# Patient Record
Sex: FEMALE | Race: WHITE | URBAN - METROPOLITAN AREA
[De-identification: names, ages, dates, MRNs, and addresses within clinical notes are randomized per-mention and may not be internally consistent; named-entity substitution may affect disease eponyms.]

---

## 2018-06-26 PROCEDURE — 88175 CYTOPATH C/V AUTO FLUID REDO: CPT | Performed by: NURSE PRACTITIONER

## 2019-07-01 PROCEDURE — 88175 CYTOPATH C/V AUTO FLUID REDO: CPT | Performed by: OBSTETRICS & GYNECOLOGY

## 2023-09-11 ENCOUNTER — TELEPHONE (OUTPATIENT)
Dept: RHEUMATOLOGY | Facility: CLINIC | Age: 63
End: 2023-09-11

## 2023-09-11 ENCOUNTER — OFFICE VISIT (OUTPATIENT)
Dept: RHEUMATOLOGY | Facility: CLINIC | Age: 63
End: 2023-09-11
Payer: COMMERCIAL

## 2023-09-11 VITALS
TEMPERATURE: 86 F | WEIGHT: 169 LBS | SYSTOLIC BLOOD PRESSURE: 142 MMHG | DIASTOLIC BLOOD PRESSURE: 68 MMHG | HEIGHT: 63 IN | RESPIRATION RATE: 16 BRPM | OXYGEN SATURATION: 98 % | BODY MASS INDEX: 29.95 KG/M2

## 2023-09-11 DIAGNOSIS — M15.9 PRIMARY OSTEOARTHRITIS INVOLVING MULTIPLE JOINTS: ICD-10-CM

## 2023-09-11 DIAGNOSIS — L40.50 PSORIATIC ARTHRITIS (HCC): Primary | ICD-10-CM

## 2023-09-11 PROBLEM — M15.0 PRIMARY OSTEOARTHRITIS INVOLVING MULTIPLE JOINTS: Status: ACTIVE | Noted: 2023-09-11

## 2023-09-11 PROCEDURE — 99215 OFFICE O/P EST HI 40 MIN: CPT | Performed by: INTERNAL MEDICINE

## 2023-09-11 PROCEDURE — 3008F BODY MASS INDEX DOCD: CPT | Performed by: INTERNAL MEDICINE

## 2023-09-11 PROCEDURE — 3077F SYST BP >= 140 MM HG: CPT | Performed by: INTERNAL MEDICINE

## 2023-09-11 PROCEDURE — 3078F DIAST BP <80 MM HG: CPT | Performed by: INTERNAL MEDICINE

## 2023-09-11 RX ORDER — SECUKINUMAB 150 MG/ML
INJECTION SUBCUTANEOUS
COMMUNITY
Start: 2023-06-20

## 2023-09-11 RX ORDER — MELOXICAM 7.5 MG/1
7.5 TABLET ORAL 2 TIMES DAILY PRN
COMMUNITY
Start: 2022-11-22

## 2023-09-14 ENCOUNTER — TELEPHONE (OUTPATIENT)
Dept: RHEUMATOLOGY | Facility: CLINIC | Age: 63
End: 2023-09-14

## 2023-10-03 ENCOUNTER — TELEPHONE (OUTPATIENT)
Dept: RHEUMATOLOGY | Facility: CLINIC | Age: 63
End: 2023-10-03

## 2023-10-03 NOTE — TELEPHONE ENCOUNTER
Phoned [facility], pt had syringes delivered in the past and prescription was for pens. Attempted tot call pt no answer. Changed to syringes per her preference.

## 2023-12-12 RX ORDER — SECUKINUMAB 150 MG/ML
300 INJECTION SUBCUTANEOUS
Qty: 2 ML | Refills: 0 | Status: SHIPPED | OUTPATIENT
Start: 2023-12-12

## 2023-12-12 NOTE — TELEPHONE ENCOUNTER
LOV:   09/11/2023      Future Appointments   Date Time Provider Indira Patricia   3/11/2024 11:00 AM Tonia Martin MD [facility] [address]       LF:  11/29/2023    QTY:   2 pens       Refills:   0    LABS: 05/23/2023 Scanned under the lab tab on 09/19/2023

## 2024-01-19 RX ORDER — SECUKINUMAB 150 MG/ML
300 INJECTION SUBCUTANEOUS
Qty: 2 ML | Refills: 0 | Status: SHIPPED | OUTPATIENT
Start: 2024-01-19

## 2024-01-19 NOTE — TELEPHONE ENCOUNTER
LOV:   09/11/2023       Future Appointments   Date Time Provider Department Center   3/11/2024 11:00 AM Zeinab Martin MD [facility] [address]       LF:    12/12/2023   QTY:  2 mL   Refills:  0    LABS: 05/23/2023

## 2024-02-16 DIAGNOSIS — L40.50 PSORIATIC ARTHRITIS (HCC): Primary | ICD-10-CM

## 2024-02-16 RX ORDER — SECUKINUMAB 150 MG/ML
300 INJECTION SUBCUTANEOUS
Qty: 2 ML | Refills: 0 | Status: SHIPPED | OUTPATIENT
Start: 2024-02-16

## 2024-02-16 NOTE — TELEPHONE ENCOUNTER
LOV:    09/11/2023      Future Appointments   Date Time Provider Department Center   3/11/2024 11:00 AM Zeinab Martin MD [facility] [address]       LF:   01/19/2024    QTY:   2 mL    Refills:   0    LABS:      Scanned in under the lab tab on 09/19/2023 Drawn on 05/23/2023  Patient is due for labs now.

## 2024-03-01 ENCOUNTER — TELEPHONE (OUTPATIENT)
Dept: RHEUMATOLOGY | Facility: CLINIC | Age: 64
End: 2024-03-01

## 2024-03-01 DIAGNOSIS — L40.50 PSORIATIC ARTHRITIS (HCC): Primary | ICD-10-CM

## 2024-03-01 NOTE — TELEPHONE ENCOUNTER
LOV 9/11/23  Future Appointments   Date Time Provider Department Center   3/11/2024 11:00 AM Zeinab Martin MD [facility] [address]

## 2024-03-08 LAB
ABSOLUTE BASOPHILS: 18 CELLS/UL (ref 0–200)
ABSOLUTE EOSINOPHILS: 194 CELLS/UL (ref 15–500)
ABSOLUTE LYMPHOCYTES: 1238 CELLS/UL (ref 850–3900)
ABSOLUTE MONOCYTES: 576 CELLS/UL (ref 200–950)
ABSOLUTE NEUTROPHILS: 2475 CELLS/UL (ref 1500–7800)
ALBUMIN/GLOBULIN RATIO: 1.4 (CALC) (ref 1–2.5)
ALBUMIN: 4.2 G/DL (ref 3.6–5.1)
ALKALINE PHOSPHATASE: 66 U/L (ref 37–153)
ALT: 20 U/L (ref 6–29)
AST: 18 U/L (ref 10–35)
BASOPHILS: 0.4 %
BILIRUBIN, TOTAL: 0.6 MG/DL (ref 0.2–1.2)
BUN: 11 MG/DL (ref 7–25)
C-REACTIVE PROTEIN: 1.4 MG/L
CALCIUM: 10.3 MG/DL (ref 8.6–10.4)
CARBON DIOXIDE: 27 MMOL/L (ref 20–32)
CHLORIDE: 100 MMOL/L (ref 98–110)
CREATININE: 0.59 MG/DL (ref 0.5–1.05)
EGFR: 102 ML/MIN/1.73M2
EOSINOPHILS: 4.3 %
GLOBULIN: 3.1 G/DL (CALC) (ref 1.9–3.7)
GLUCOSE: 92 MG/DL (ref 65–99)
HEMATOCRIT: 38.3 % (ref 35–45)
HEMOGLOBIN: 12.5 G/DL (ref 11.7–15.5)
LYMPHOCYTES: 27.5 %
MCH: 27.7 PG (ref 27–33)
MCHC: 32.6 G/DL (ref 32–36)
MCV: 84.7 FL (ref 80–100)
MONOCYTES: 12.8 %
MPV: 11.7 FL (ref 7.5–12.5)
NEUTROPHILS: 55 %
PLATELET COUNT: 221 THOUSAND/UL (ref 140–400)
POTASSIUM: 4 MMOL/L (ref 3.5–5.3)
PROTEIN, TOTAL: 7.3 G/DL (ref 6.1–8.1)
RDW: 12.2 % (ref 11–15)
RED BLOOD CELL COUNT: 4.52 MILLION/UL (ref 3.8–5.1)
SED RATE BY MODIFIED$WESTERGREN: 2 MM/H
SODIUM: 136 MMOL/L (ref 135–146)
URIC ACID: 4.8 MG/DL (ref 2.5–7)
WHITE BLOOD CELL COUNT: 4.5 THOUSAND/UL (ref 3.8–10.8)

## 2024-03-11 ENCOUNTER — OFFICE VISIT (OUTPATIENT)
Dept: RHEUMATOLOGY | Facility: CLINIC | Age: 64
End: 2024-03-11
Payer: COMMERCIAL

## 2024-03-11 VITALS
SYSTOLIC BLOOD PRESSURE: 132 MMHG | OXYGEN SATURATION: 98 % | BODY MASS INDEX: 28.88 KG/M2 | DIASTOLIC BLOOD PRESSURE: 74 MMHG | WEIGHT: 163 LBS | RESPIRATION RATE: 16 BRPM | HEIGHT: 63 IN

## 2024-03-11 DIAGNOSIS — M25.432 PAIN AND SWELLING OF LEFT WRIST: ICD-10-CM

## 2024-03-11 DIAGNOSIS — M19.90 OSTEOARTHRITIS, UNSPECIFIED OSTEOARTHRITIS TYPE, UNSPECIFIED SITE: ICD-10-CM

## 2024-03-11 DIAGNOSIS — L40.50 PSORIATIC ARTHRITIS (HCC): Primary | ICD-10-CM

## 2024-03-11 DIAGNOSIS — M85.89 OSTEOPENIA OF MULTIPLE SITES: ICD-10-CM

## 2024-03-11 DIAGNOSIS — M25.532 PAIN AND SWELLING OF LEFT WRIST: ICD-10-CM

## 2024-03-11 PROCEDURE — 3075F SYST BP GE 130 - 139MM HG: CPT | Performed by: INTERNAL MEDICINE

## 2024-03-11 PROCEDURE — 3008F BODY MASS INDEX DOCD: CPT | Performed by: INTERNAL MEDICINE

## 2024-03-11 PROCEDURE — 99214 OFFICE O/P EST MOD 30 MIN: CPT | Performed by: INTERNAL MEDICINE

## 2024-03-11 PROCEDURE — 3078F DIAST BP <80 MM HG: CPT | Performed by: INTERNAL MEDICINE

## 2024-03-11 NOTE — PROGRESS NOTES
[facility], [address] [place] [address]      Consult     Chey Peterson Patient Status:  No patient class for patient encounter    1960 MRN [id number]   Location [facility], [address] [place] [address] Attending No att. providers found   Hosp Day # 0 PCP MILANA ARIAS     Referring Provider: PCP    Reason for Consultation: Psoriatic arthritis    Subjective:    Chey Peterson is a 63 year old female with     62-year-old woman comes in for reevaluation for history of cutaneous lupus, raynauds, generalized osteoarthritis and now likely psoriatic arthritis and osteopenia with low fracture risk.    She was last seen in clinic 2023    She was improved significantly on Cosentyx and she had continued had mild synovitis in psoriasis and we increased dose to 300 mg subcu once a month 4 months ago      No further swelling of her joints or psoriasis but she states every time she takes the Cosentyx she has significant night sweats.  Patient has already failed multiple other medications.  She would like to try going down back on the Cosentyx 150 mg subcu a month and she understands she may worsen again with her joints and her skin but she would like to try this first before switching therapy again    She also had a fall recently chasing her dog this morning and some wrist pain and swelling associated with this.  She is not interested in x-rays or imaging.  She will let us know if she changes her mind or see her PCP locally they are about 50 minutes away    No side effects.    Previously had injection site reactions to Humira; Enbrel and failed [facility] leflunomide and hydroxychloroquine and methotrexate    She is given meloxicam which she does use periodically with relief    She does do a lot of crocheting and she does also cook a lot    She is followed by dermatology with topical shampoos and creams for her scalp as well as a small lesion on her right  elbow    Unfortunately he also had injection site reaction to Enbrel    Also had reaction to Humira    Denies any infection since last visit    She is now off hydroxychloroquine    Eye exam was normal November 2022.  She has been off hydroxychloroquine    Previously We had stopped leflunomide    X-rays of the hands and feet were updated showing minimal arthritic changes chest x-ray was normal January 2021    No major stiffness or swelling of her joints    She has not had any infections shortness of breath chest pain fevers or chills.    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    We have discussed possibility of this being psoriasis to begin with especially with her autoimmune testing being negative on several occasions over the years    Has history of abnormal LFTs on methotrexate in the past    She was noted to have mild abnormal LFT. She states she has made some dietary changes and her primary care physician is aware of this. She never had an ultrasound to confirm fatty liver.    Her raynauds has been stable. She denies any oral or nasal ulcers , chest pain shortness of breath fevers or chills. Denies any joint pain or stiffness.   She denies any new rashes. She is trying to take calcium and vitamin D regularly. She does take Tylenol when necessary and Aleve for mild arthritic pain. Denies any night sweats.    Her last DEXA scan was in November 2016. It showed osteopenia with low fracture risk. This is closely followed by her OB/GYN.  States she had another DEXA scan which she would like to obtain.    States no falls or fractures    She is trying to take calcium and vitamin D regularly.      History/Other:        Past Medical History:  Past Medical History:   Diagnosis Date    Essential  hypertension     Osteopenia     Systemic lupus erythematosus (HCC)         Past Surgical History:   Past Surgical History:   Procedure Laterality Date    HYSTERECTOMY  2010    [id number]       Social History:  reports that she has never smoked. She has never used smokeless tobacco. She reports that she does not drink alcohol and does not use drugs.    Family History:   Family History   Problem Relation Age of Onset    Colon Cancer Father     Heart Disease Father     Breast Cancer Sister     Breast Cancer Paternal Aunt     No Known Problems Mother        Allergies:   Allergies   Allergen Reactions    Enbrel [Etanercept] SWELLING    Humira SWELLING       Current Medications:  Current Outpatient Medications   Medication Sig Dispense Refill    Secukinumab, 300 MG Dose, (COSENTYX, 300 MG DOSE,) 150 MG/ML Subcutaneous Solution Prefilled Syringe Inject 300 mg into the skin every 30 (thirty) days. 2 mL 0    Lisinopril-Hydrochlorothiazide 10-12.5 MG Oral Tab TAKE 1 TABLET BY MOUTH ONE TIME A DAY  3    Mometasone Furoate (NASONEX) 50 MCG/ACT Nasal Suspension by Nasal route daily.      Calcium-Phosphorus-Vitamin D (CITRACAL +D3 OR) Take by mouth.      Cetirizine HCl (ZYRTEC ALLERGY OR) Take by mouth.      Dextromethorphan-Guaifenesin (MUCINEX DM OR) Take by mouth.            (Not in a hospital admission)      Review of Systems:     Constitutional: Negative for chills, , fatigue, fever and unexpected weight change.    HENT: Negative for congestion, and mouth sores.    Eyes: Negative for photophobia, pain, redness and visual disturbance.    Respiratory: Negative for apnea, cough, chest tightness, shortness of breath, wheezing and stridor.    Cardiovascular: Negative for chest pain, palpitations and leg swelling.    Gastrointestinal: Negative for abdominal distention, abdominal pain, blood in stool, constipation, diarrhea and nausea.    Endocrine: Negative.     Genitourinary: Negative for decreased urine volume, difficulty  urinating, dyspareunia, dysuria, flank pain, and frequency.    Musculoskeletal: Occasional arthralgias, gait problem and joint swelling.    Skin: Negative for color change, pallor and rash. No raynauds or digital ulcerations no sclerodactly.  Mild scalp psoriasis    Allergic/Immunologic: Negative.    Neurological: Negative for dizziness, tremors, seizures, syncope, speech difficulty, weakness, light-headedness, numbness and headaches.    Hematological: Does not bruise/bleed easily.    Psychiatric/Behavioral: Negative for confusion, decreased concentration, hallucinations, self-injury, sleep disturbance and suicidal ideas or depression.    Objective:   [unfilled]  Vitals:    03/11/24 1102   BP: 132/74   Resp: 16            Constitutional: is oriented to person, place, and time. Appears well-developed and well-nourished. No distress.    HEENT: Normocephalic; EOMI; no jvd; no LAD; no oral or nasal ulcers.     Eyes: Conjunctivae and EOM are normal. Pupils are equal, round, and reactive to light.     Neck: Normal range of motion. No thyromegaly present.    Cardiovascular: RRR, no murmurs.    Lungs: Clear, Bilateral air entry, no wheezes.    Abdominal: Soft.    Musculoskeletal:    There is currently no information documented on the homunculus. Go to the Rheumatology activity and [facility]     Joint Exam 03/11/2024     No joint exam has been documented for this visit        Swollen: --     Tender: --         Right shoulder: Exhibits normal range of motion on abduction and internal rotation, no tenderness, no bony tenderness, no deformity, no laceration, no pain and no spasm.        Left shoulder: Exhibits normal range of motion on abduction and internal and external rotation.  no tenderness, no bony tenderness, no swelling, no effusion, no deformity, no pain, no spasm and normal strength.        Right elbow:  Exhibits normal range of motion, no swelling, no effusion and no deformity. No  tenderness found. No medial epicondyle, no lateral epicondyle and no olecranon process tenderness noted. There are no contractures or tophi or nodules.        Left elbow:  Normal range of motion, no swelling, no effusion and no deformity. No medial epicondyle, no lateral epicondyle and no olecranon process tenderness noted. There are no contractures or tophi or nodules.        Right wrist:  Exhibits normal range of motion, no tenderness, no bony tenderness, no swelling, no effusion and no crepitus. Flexion and extension intact w/o limitation.        Left wrist: Mild soft tissue swelling with tenderness, no bony tenderness, + swelling, no effusion, no crepitus and no deformity. Flexion and extension mildly limited because of the swelling with recent fall        Right hip: Exhibits normal range of motion, normal strength, no tenderness, no bony tenderness, no swelling and no crepitus.        Right hand: No synovitis of  or DIP joints; few small scattered Bouchards and Heberden nodules noted;  strength: 100%.  Mild squaring first CMC joint        Left hand: No synovitis of  DIP joints; few small scattered Bouchards and Heberden nodules noted;  strength: 100%.  Mild squaring first CMC joint        Left hip: Exhibits normal range of motion, normal strength, no tenderness, no bony tenderness, No swelling and no crepitus.        Right knee: Exhibits normal range of motion, no swelling, no effusion, no ecchymosis, no deformity and no erythema. No tenderness found. No medial joint line, no lateral joint line, no MCL and no LCL tenderness noted. mild crepitation on flexion of knee and extension normal.        Left knee:  Exhibits normal range of motion, no swelling, no effusion, no ecchymosis and no erythema. No tenderness found. No medial joint line, no lateral joint line and no patellar tendon tenderness noted. mild crepitation on flexion of the knee. Extension intact and normal.        Right ankle: No swelling, no  deformity. No tenderness. Dorsiflexion and plantar flexion intact without limitation in range of motion.        Left ankle: Exhibits no swelling. No tenderness. No lateral malleolus and no medial malleolus tenderness found. Achilles tendon normal. Achilles tendon exhibits no pain, no defect and normal English's test results.  Dorsiflexion and plantar flexion intact without limitation in range of motion.        Cervical back: Exhibits normal range of motion, no tenderness, no bony tenderness, no swelling, no pain and no spasm.        Thoracic back: Exhibits normal range of motion, no tenderness, no bony tenderness and no spasm.        Lumbar back:  Exhibits normal range of motion, no tenderness, no bony tenderness, no pain and no spasm.        Right foot: normal. There is normal range of motion, no tenderness, no bony tenderness, no crepitus and no laceration. There is no synovitis or tenderness of the MTP joints to palpation.  Bony enlargement first MTP joint        Left foot: normal. There is normal range of motion, no tenderness, no bony tenderness and no crepitus. There is no synovitis or tenderness of the MTP joints to palpation.  Mild bony enlargement first MTP joint    Lymphadenopathy: No submental, no submandibular, and no occipital adenopathy present, has no cervical adenopathy or axillary lympadenopathy.    Neurological: Alert and oriented. No focal motor or sensory abnormalities. Strength is 5/5 Upper Extremities/Lower Extremities proximally and distally.    Skin: Skin is warm, dry and intact.  Mild psoriasis of the scalp    Psychiatric: Normal behavior.    Results:    Labs:      No results found for: \"WBC\", \"RBC\", \"HGB\", \"HCT\", \"MCV\", \"MCH\", \"MCHC\", \"RDW\", \"PLT\", \"MPV\", \"LYMPHOCYTES\", \"NEUT\"    No components found for: \"RELY\", \"NMET\", \"MYEL\", \"PROMY\", \"ARUN\", \"ABSNEUTS\", \"ABSBANDS\", \"ABMM\", \"ABMY\", \"ABPM\", \"ABBL\"      No results found for: \"NA\", \"K\", \"CHLORIDE\", \"CO2\", \"BUN\", \"CREAT\", \"GFR\", \"ALB\",  \"ALKPHOS\", \"AST\", \"ALT\"       No components found for: \"ESRWESTERGRN\"       No results found for: \"CRP\"      No results found for: \"COLOR\", \"CLARITY\", \"UROBILINOGEN\", \"YEAST\"    Noted CBC CMP normal TB testing normal May 2023          [unfilled]    Imaging:  No results found.    Assessment & Plan:      63-year-old woman comes in for reevaluation for:    Psoriatic arthritis (extensive plaque psoriasis with erosive changes on MRI of the hand)  Raynauds  Generalized osteoarthritis  Osteopenia with low fracture risk  High Drug risk monitoring  History of abnormal LFTs  Mild left wrist swelling.  Patient declined imaging from recent fall    No further synovitis or psoriasis but patient's states significant night sweats with injection with Cosentyx.     He did not have the symptoms with the lower dose and would like to try going back down Cosentyx 150 mg subcu week minimal psoriasis.  She understands her psoriasis and joint pain may worsen again.  But she would like to try this first before switching treatment    If she worsens she will need to call us to consider switching treatment.    She declines for now    Given her information on [facility] and [employer] in the meantime for further reading    Previously failed methotrexate leflunomide hydroxychloroquine Humira Enbrel and Kerry    Suspect her CMC joint and hand pain is likely more arthritic and tendon related    Continue meloxicam 7.5 twice a day when necessary  Risk of NSAIDs discussed    Previously seen by dermatology    Remain off leflunomide. Remain off hydroxychloroquine weaned last visit    Could consider another class of medication if she has problems with this as well    Her autoimmune testing has been normal on several occasions otherwise  I question whether she actually had discoid lupus in the past?    I discussed monitoring for changes of lupus over time    Failed methotrexate And leflunomide with abnormal LFTs in the past    Patient is asymptomatic from her  raynauds    Continue calcium and vitamin D states had her bone density would like to obtain this report    Avoid too much Tylenol and Aleve    Education and counseling provided to patient.  Instructed patient to call my office or seek medical attention immediately if symptoms worsen. Risks and side effects of medications and diagnosis discussed in detail and patient was given written information on new prescribed medications.    Return to clinic:  Return in about 6 months (around 9/11/2024).    Zeinab Martin MD  9/11/2023

## 2024-03-18 DIAGNOSIS — L40.50 PSORIATIC ARTHRITIS (HCC): Primary | ICD-10-CM

## 2024-03-18 RX ORDER — SECUKINUMAB 150 MG/ML
300 INJECTION SUBCUTANEOUS
Qty: 2 EACH | Refills: 2 | Status: SHIPPED | OUTPATIENT
Start: 2024-03-18

## 2024-03-18 NOTE — TELEPHONE ENCOUNTER
Last office visit: 3/11/2024    Next Rheum Apt:9/16/2024 Zeinab Martin MD    Last fill: 2/16/2024 2 mL, 0 refills    Labs:   Lab Results   Component Value Date    CREATSERUM 0.59 03/07/2024    ALKPHO 66 03/07/2024    AST 18 03/07/2024    ALT 20 03/07/2024    BILT 0.6 03/07/2024    TP 7.3 03/07/2024    ALB 4.2 03/07/2024       Lab Results   Component Value Date    WBC 4.5 03/07/2024    HGB 12.5 03/07/2024     03/07/2024    NEPERCENT 55 03/07/2024    LYPERCENT 27.5 03/07/2024    NE 2,475 03/07/2024    LYMABS 1,238 03/07/2024

## 2024-05-15 LAB
MITOGEN-NIL: 6.35 IU/ML
NIL: 0.01 IU/ML
QUANTIFERON(R)-TB GOLD PLUS, 1 TUBE: NEGATIVE
TB1-NIL: 0 IU/ML
TB2-NIL: 0 IU/ML

## 2024-07-03 ENCOUNTER — PATIENT MESSAGE (OUTPATIENT)
Dept: RHEUMATOLOGY | Facility: CLINIC | Age: 64
End: 2024-07-03

## 2024-07-03 DIAGNOSIS — L40.50 PSORIATIC ARTHRITIS (HCC): ICD-10-CM

## 2024-07-03 RX ORDER — SECUKINUMAB 150 MG/ML
300 INJECTION SUBCUTANEOUS
Qty: 2 EACH | Refills: 2 | Status: SHIPPED | OUTPATIENT
Start: 2024-07-03 | End: 2024-07-05

## 2024-07-03 NOTE — TELEPHONE ENCOUNTER
LOV:  03/11/2024    Future Appointments   Date Time Provider Department Center   9/16/2024  9:40 AM Zeinab Martin MD [facility] [address]       LF:  03/18/2024    QTY:   2 Each    Refills:   2    LABS:  Scanned under the lab tab on 03/22/2024 DRAWN on 03/07/2024

## 2024-07-03 NOTE — TELEPHONE ENCOUNTER
From: Chey Peterson  To: Zeinab Martin  Sent: 7/3/2024 7:23 AM CDT  Subject: Refill    Good morning.  I called [facility] to get a refill ,they said I needed to contact my doctor. Usually they contact you. I do not need it til next week, but following directions.   Thank you.

## 2024-07-08 RX ORDER — SECUKINUMAB 150 MG/ML
300 INJECTION SUBCUTANEOUS
Qty: 2 EACH | Refills: 2 | Status: SHIPPED | OUTPATIENT
Start: 2024-07-08

## 2024-07-10 ENCOUNTER — PATIENT MESSAGE (OUTPATIENT)
Dept: RHEUMATOLOGY | Facility: CLINIC | Age: 64
End: 2024-07-10

## 2024-07-11 NOTE — TELEPHONE ENCOUNTER
Authorization valid until at lease 9/11/24.   [facility] can take a few days to verify prescriptions.     Will have patient follow up with [facility]

## 2024-09-16 ENCOUNTER — OFFICE VISIT (OUTPATIENT)
Dept: RHEUMATOLOGY | Facility: CLINIC | Age: 64
End: 2024-09-16
Payer: COMMERCIAL

## 2024-09-16 VITALS
HEIGHT: 63 IN | WEIGHT: 161 LBS | DIASTOLIC BLOOD PRESSURE: 72 MMHG | SYSTOLIC BLOOD PRESSURE: 120 MMHG | BODY MASS INDEX: 28.53 KG/M2 | HEART RATE: 78 BPM | OXYGEN SATURATION: 98 % | TEMPERATURE: 98 F | RESPIRATION RATE: 16 BRPM

## 2024-09-16 DIAGNOSIS — L40.50 PSORIATIC ARTHRITIS (HCC): Primary | ICD-10-CM

## 2024-09-16 DIAGNOSIS — M85.89 OSTEOPENIA OF MULTIPLE SITES: ICD-10-CM

## 2024-09-16 DIAGNOSIS — M15.0 PRIMARY OSTEOARTHRITIS INVOLVING MULTIPLE JOINTS: ICD-10-CM

## 2024-09-16 DIAGNOSIS — Z79.899 ENCOUNTER FOR DRUG THERAPY: ICD-10-CM

## 2024-09-16 DIAGNOSIS — L50.9 URTICARIA: ICD-10-CM

## 2024-09-16 PROBLEM — M25.532 PAIN AND SWELLING OF LEFT WRIST: Status: RESOLVED | Noted: 2024-03-11 | Resolved: 2024-09-16

## 2024-09-16 PROBLEM — M25.432 PAIN AND SWELLING OF LEFT WRIST: Status: RESOLVED | Noted: 2024-03-11 | Resolved: 2024-09-16

## 2024-09-16 PROCEDURE — 3008F BODY MASS INDEX DOCD: CPT | Performed by: INTERNAL MEDICINE

## 2024-09-16 PROCEDURE — 99215 OFFICE O/P EST HI 40 MIN: CPT | Performed by: INTERNAL MEDICINE

## 2024-09-16 PROCEDURE — 3074F SYST BP LT 130 MM HG: CPT | Performed by: INTERNAL MEDICINE

## 2024-09-16 PROCEDURE — 3078F DIAST BP <80 MM HG: CPT | Performed by: INTERNAL MEDICINE

## 2024-09-16 PROCEDURE — G2211 COMPLEX E/M VISIT ADD ON: HCPCS | Performed by: INTERNAL MEDICINE

## 2024-09-16 RX ORDER — MELOXICAM 15 MG/1
15 TABLET ORAL DAILY
Qty: 30 TABLET | Refills: 1 | Status: SHIPPED | OUTPATIENT
Start: 2024-09-16

## 2024-09-16 NOTE — PROGRESS NOTES
[facility], [address] [place] [address]      Consult     Chey Peterson Patient Status:  No patient class for patient encounter    1960 MRN [id number]   Location [facility], [address] [place] [address] Attending No att. providers found   Hosp Day # 0 PCP MILANA ARIAS     Referring Provider: PCP    Reason for Consultation: Psoriatic arthritis    Subjective:    Chey Peterson is a 64 year old female with     62-year-old woman comes in for reevaluation for history of cutaneous lupus, raynauds, generalized osteoarthritis and now likely psoriatic arthritis and osteopenia with low fracture risk.    She was last seen in clinic 2024    She was improved significantly on Cosentyx 300 mg subcu a month      No further swelling of her joints or psoriasis but she states every time she takes the Cosentyx she has significant night sweats.  Patient has already failed multiple other medications.        She never ended up decreasing the dose and is not interested in switching into another medication    States that it does work up to 3 weeks and notices mild stiffness before the injection in 4 weeks.    Considered going back on oral sulfasalazine but patient also did stop meloxicam which she is open to trying for the week that her stiffness increases slightly before the injection instead    We have discussed meloxicam but can also be used for arthritic pain as needed with the winter season coming up    We have reminded patient to update labs today    Otherwise she states she is doing well no active psoriasis or major flareups of her joint    Previously had injection site reactions to Humira; Enbrel and failed Symphony leflunomide and hydroxychloroquine and methotrexate    She does do a lot of crocheting and she does also cook a lot    She is followed by dermatology with topical shampoos and creams for her scalp as well as a small lesion on her right elbow    Unfortunately he also  had injection site reaction to Enbrel    Also had reaction to Humira    Denies any infection since last visit    She is now off hydroxychloroquine    Eye exam was normal November 2022.  She has been off hydroxychloroquine    Previously We had stopped leflunomide    X-rays of the hands and feet were updated showing minimal arthritic changes chest x-ray was normal January 2021    No major stiffness or swelling of her joints    She has not had any infections shortness of breath chest pain fevers or chills.    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    We have discussed possibility of this being psoriasis to begin with especially with her autoimmune testing being negative on several occasions over the years    Has history of abnormal LFTs on methotrexate in the past    She was noted to have mild abnormal LFT. She states she has made some dietary changes and her primary care physician is aware of this. She never had an ultrasound to confirm fatty liver.    Her raynauds has been stable. She denies any oral or nasal ulcers , chest pain shortness of breath fevers or chills. Denies any joint pain or stiffness.   She denies any new rashes. She is trying to take calcium and vitamin D regularly. She does take Tylenol when necessary and Aleve for mild arthritic pain. Denies any night sweats.    Her last DEXA scan was in November 2016. It showed osteopenia with low fracture risk. This is closely followed by her OB/GYN.  States she had another DEXA scan which she would like to obtain.    States no falls or fractures    She is trying to take calcium and vitamin D regularly.      History/Other:        Past Medical History:  Past Medical History:    Essential hypertension    Osteopenia    Systemic lupus  erythematosus (HCC)        Past Surgical History:   Past Surgical History:   Procedure Laterality Date    Hysterectomy  2010    [id number]       Social History:  reports that she has never smoked. She has never used smokeless tobacco. She reports that she does not drink alcohol and does not use drugs.    Family History:   Family History   Problem Relation Age of Onset    Colon Cancer Father     Heart Disease Father     Breast Cancer Sister     Breast Cancer Paternal Aunt     No Known Problems Mother        Allergies:   Allergies   Allergen Reactions    Enbrel [Etanercept] SWELLING    Humira SWELLING    Amoxicillin RASH       Current Medications:  Current Outpatient Medications   Medication Sig Dispense Refill    Meloxicam 15 MG Oral Tab Take 1 tablet (15 mg total) by mouth daily. 30 tablet 1    Secukinumab, 300 MG Dose, (COSENTYX, 300 MG DOSE,) 150 MG/ML Subcutaneous Solution Prefilled Syringe Inject 300 mg into the skin every 30 (thirty) days. 2 each 2    Lisinopril-Hydrochlorothiazide 10-12.5 MG Oral Tab TAKE 1 TABLET BY MOUTH ONE TIME A DAY  3    Mometasone Furoate (NASONEX) 50 MCG/ACT Nasal Suspension by Nasal route daily.      Calcium-Phosphorus-Vitamin D (CITRACAL +D3 OR) Take by mouth.      Cetirizine HCl (ZYRTEC ALLERGY OR) Take by mouth.      Dextromethorphan-Guaifenesin (MUCINEX DM OR) Take by mouth.        No current facility-administered medications for this visit.       (Not in a hospital admission)      Review of Systems:     Constitutional: Negative for chills, , fatigue, fever and unexpected weight change.    HENT: Negative for congestion, and mouth sores.    Eyes: Negative for photophobia, pain, redness and visual disturbance.    Respiratory: Negative for apnea, cough, chest tightness, shortness of breath, wheezing and stridor.    Cardiovascular: Negative for chest pain, palpitations and leg swelling.    Gastrointestinal: Negative for abdominal distention, abdominal pain, blood in stool,  constipation, diarrhea and nausea.    Endocrine: Negative.     Genitourinary: Negative for decreased urine volume, difficulty urinating, dyspareunia, dysuria, flank pain, and frequency.    Musculoskeletal: Occasional arthralgias, gait problem and joint swelling.    Skin: Negative for color change, pallor and rash. No raynauds or digital ulcerations no sclerodactly.  Mild scalp psoriasis    Allergic/Immunologic: Negative.    Neurological: Negative for dizziness, tremors, seizures, syncope, speech difficulty, weakness, light-headedness, numbness and headaches.    Hematological: Does not bruise/bleed easily.    Psychiatric/Behavioral: Negative for confusion, decreased concentration, hallucinations, self-injury, sleep disturbance and suicidal ideas or depression.    Objective:   [unfilled]  Vitals:    09/16/24 0943   BP: 120/72   Pulse: 78   Resp: 16   Temp: 98.4 °F (36.9 °C)            Constitutional: is oriented to person, place, and time. Appears well-developed and well-nourished. No distress.    HEENT: Normocephalic; EOMI; no jvd; no LAD; no oral or nasal ulcers.     Eyes: Conjunctivae and EOM are normal. Pupils are equal, round, and reactive to light.     Neck: Normal range of motion. No thyromegaly present.    Cardiovascular: RRR, no murmurs.    Lungs: Clear, Bilateral air entry, no wheezes.    Abdominal: Soft.    Musculoskeletal:    There is currently no information documented on the homunculus. Go to the Rheumatology activity and complete the homunculus joint exam.     Joint Exam 09/16/2024     No joint exam has been documented for this visit        Swollen: --     Tender: --         Right shoulder: Exhibits normal range of motion on abduction and internal rotation, no tenderness, no bony tenderness, no deformity, no laceration, no pain and no spasm.        Left shoulder: Exhibits normal range of motion on abduction and internal and external rotation.  no tenderness, no bony tenderness, no swelling, no  effusion, no deformity, no pain, no spasm and normal strength.        Right elbow:  Exhibits normal range of motion, no swelling, no effusion and no deformity. No tenderness found. No medial epicondyle, no lateral epicondyle and no olecranon process tenderness noted. There are no contractures or tophi or nodules.        Left elbow:  Normal range of motion, no swelling, no effusion and no deformity. No medial epicondyle, no lateral epicondyle and no olecranon process tenderness noted. There are no contractures or tophi or nodules.        Right wrist:  Exhibits normal range of motion, no tenderness, no bony tenderness, no swelling, no effusion and no crepitus. Flexion and extension intact w/o limitation.        Left wrist: No synovitis no tenderness, no bony tenderness, no swelling, no effusion, no crepitus and no deformity. Flexion and extension without limitation        Right hip: Exhibits normal range of motion, normal strength, no tenderness, no bony tenderness, no swelling and no crepitus.        Right hand: No synovitis of PIP MCP or DIP joints; few small scattered Bouchards and Heberden nodules noted;  strength: 100%.  Mild squaring first CMC joint        Left hand: No synovitis of MCP PIP DIP joints; few small scattered Bouchards and Heberden nodules noted;  strength: 100%.  Mild squaring first CMC joint        Left hip: Exhibits normal range of motion, normal strength, no tenderness, no bony tenderness, No swelling and no crepitus.        Right knee: Exhibits normal range of motion, no swelling, no effusion, no ecchymosis, no deformity and no erythema. No tenderness found. No medial joint line, no lateral joint line, no MCL and no LCL tenderness noted. mild crepitation on flexion of knee and extension normal.        Left knee:  Exhibits normal range of motion, no swelling, no effusion, no ecchymosis and no erythema. No tenderness found. No medial joint line, no lateral joint line and no patellar  tendon tenderness noted. mild crepitation on flexion of the knee. Extension intact and normal.        Right ankle: No swelling, no deformity. No tenderness. Dorsiflexion and plantar flexion intact without limitation in range of motion.        Left ankle: Exhibits no swelling. No tenderness. No lateral malleolus and no medial malleolus tenderness found. Achilles tendon normal. Achilles tendon exhibits no pain, no defect and normal English's test results.  Dorsiflexion and plantar flexion intact without limitation in range of motion.        Cervical back: Exhibits normal range of motion, no tenderness, no bony tenderness, no swelling, no pain and no spasm.        Thoracic back: Exhibits normal range of motion, no tenderness, no bony tenderness and no spasm.        Lumbar back:  Exhibits normal range of motion, no tenderness, no bony tenderness, no pain and no spasm.        Right foot: normal. There is normal range of motion, no tenderness, no bony tenderness, no crepitus and no laceration. There is no synovitis or tenderness of the MTP joints to palpation.  Bony enlargement first MTP joint        Left foot: normal. There is normal range of motion, no tenderness, no bony tenderness and no crepitus. There is no synovitis or tenderness of the MTP joints to palpation.  Mild bony enlargement first MTP joint    Lymphadenopathy: No submental, no submandibular, and no occipital adenopathy present, has no cervical adenopathy or axillary lympadenopathy.    Neurological: Alert and oriented. No focal motor or sensory abnormalities. Strength is 5/5 Upper Extremities/Lower Extremities proximally and distally.    Skin: Skin is warm, dry and intact.  Mild psoriasis of the scalp    Psychiatric: Normal behavior.    Results:    Labs:      Lab Results   Component Value Date    WBC 4.5 03/07/2024    RBC 4.52 03/07/2024    HGB 12.5 03/07/2024    HCT 38.3 03/07/2024    MCV 84.7 03/07/2024    MCH 27.7 03/07/2024    MCHC 32.6 03/07/2024     RDW 12.2 03/07/2024     03/07/2024       No components found for: \"RELY\", \"NMET\", \"MYEL\", \"PROMY\", \"ARUN\", \"ABSNEUTS\", \"ABSBANDS\", \"ABMM\", \"ABMY\", \"ABPM\", \"ABBL\"      Lab Results   Component Value Date     03/07/2024    K 4.0 03/07/2024    CO2 27 03/07/2024    BUN 11 03/07/2024    ALB 4.2 03/07/2024    AST 18 03/07/2024    ALT 20 03/07/2024          No components found for: \"ESRWESTERGRN\"       No results found for: \"CRP\"      No results found for: \"COLOR\", \"CLARITY\", \"UROBILINOGEN\", \"YEAST\"    Noted CBC CMP normal TB testing normal May 2023          [unfilled]    Imaging:  No results found.    Assessment & Plan:      64-year-old woman comes in for reevaluation for:    Psoriatic arthritis (extensive plaque psoriasis with erosive changes on MRI of the hand)  Raynauds  Generalized osteoarthritis  Osteopenia with low fracture risk  High Drug risk monitoring  History of abnormal LFTs  Mild left wrist swelling.  Patient declined imaging from recent fall    No further synovitis or psoriasis   Initially had some sweats but she states that has subsided and she is not interested in switching medication  Stiffness after the third week she is open to going back on meloxicam is not interested in going back on oral DMARD sulfasalazine or methotrexate  History of abnormal LFTs in the past    If she worsens she will need to call us to consider switching treatment.    She declines for now    Given her information on [employer] and [employer] in the meantime for further reading    Previously failed methotrexate leflunomide hydroxychloroquine Humira Enbrel and Kerry    Suspect her CMC joint and hand pain is likely more arthritic and tendon related    Restart meloxicam 15 mg daily.  Risk of NSAIDs discussed reminded patient update CBC CMP today and ESR    Risk of NSAIDs discussed    Previously seen by dermatology    Update x-rays and DEXA scan next visit    Remain off leflunomide. Remain off hydroxychloroquine weaned last  visit    Could consider another class of medication if she has problems with this as well    Her autoimmune testing has been normal on several occasions otherwise  I question whether she actually had discoid lupus in the past?    I discussed monitoring for changes of lupus over time    Failed methotrexate And leflunomide with abnormal LFTs in the past    Consider sulfasalazine in the future    Patient is asymptomatic from her raynauds    Continue calcium and vitamin D states had her bone density would like to obtain this report    Avoid too much Tylenol and Aleve    Education and counseling provided to patient.  Instructed patient to call my office or seek medical attention immediately if symptoms worsen. Risks and side effects of medications and diagnosis discussed in detail and patient was given written information on new prescribed medications.    Return to clinic:  Return in about 6 months (around 3/16/2025).    Zeinab Martin MD  9/11/2023

## 2024-09-16 NOTE — TELEPHONE ENCOUNTER
Patient states she prefers the autoinjector.     Is there a reason why they are getting the prefilled syringe    If its an inusrance issue then they are fine to keep it    Getting labs today

## 2024-09-16 NOTE — PATIENT INSTRUCTIONS
OSTEOARTHRITIS    Fast Facts    Though some of the joint changes are irreversible, most patients will not need joint replacement surgery.    OA symptoms (what you feel) can vary greatly among patients.    A rheumatologist can detect arthritis and prescribe the proper treatment. The goal of treatment in OA is to reduce pain and improve function.    Exercise is an important part of OA treatment, because it can decrease joint pain and improve function.    At present, there is no treatment that can reverse the damage of OA in the joints. Researchers are trying to find ways to slow or reverse this joint damage.    Osteoarthritis (also known as OA) is a common joint disease that most often affects middle-age to elderly people. It is commonly referred to as \"wear and tear\" of the joints, but we now know that OA is a disease of the entire joint, involving the cartilage, joint lining, ligaments, and bone. Although it is more common in older people, it is not really accurate to say that the joints are just \"wearing out.\" It is characterized by breakdown of the cartilage (the tissue that cushions the ends of the bones between joints), bony changes of the joints, deterioration of tendons and ligaments, and various degrees of inflammation of the joint lining (called the synovium).    This arthritis tends to occur in the hand joints, spine, hips, knees, and great toes. The lifetime risk of developing OA of the knee is about 46%, and the lifetime risk of developing OA of the hip is 25%, according to the [facility] Osteoarthritis Project, a long-term study from the Counts include 234 beds at the [facility] and sponsored by the Centers for Disease Control and Prevention (often called the CDC) and the National Institutes of Health.    OA is a top cause of disability in older people. The goal of osteoarthritis treatment is to reduce pain and improve function. There is no cure for the disease, but some treatments attempt to slow disease  progression.         What is osteoarthritis?    OA is a frequently slowly progressive joint disease typically seen in middle-aged to elderly people. In osteoarthritis, the cartilage between the bones in the joint breaks down. This causes the affected bones to slowly get bigger. The joint cartilage often breaks down because of mechanical stress or biochemical changes within the body, causing the bone underneath to fail. OA can occur together with other types of arthritis, such as gout or rheumatoid arthritis.    OA tends to affect commonly used joints such as the hands and spine, and the weight-bearing joints such as the hips and knees. Symptoms include:    Joint pain and stiffness    Knobby swelling at the joint    Cracking or grinding noise with joint movement    Decreased function of the joint    How do you treat osteoarthritis?    There is no proven treatment yet that can reverse joint damage from OA. The goal of osteoarthritis treatment is to reduce pain and improve function of the affected joints. Most often, this is possible with a mixture of physical measures and drug therapy and, sometimes, surgery.    Physical measures: Weight loss and exercise are useful in OA. Excess weight puts stress on your knee joints and hips and low back. For every 10 pounds of weight you lose over 10 years, you can reduce the chance of developing knee OA by up to 50 percent. Exercise can improve your muscle strength, decrease joint pain and stiffness, and lower the chance of disability due to OA. Also helpful are support (\"assistive\") devices, such as orthotics or a walking cane, that help you do daily activities. Heat or cold therapy can help relieve OA symptoms for a short time.    Certain alternative treatments such as spa (hot tub), massage, and chiropractic manipulation can help relieve pain for a short time. They can be costly, though, and require repeated treatments. Also, the long-term benefits of these alternative  (sometimes called complementary or integrative) medicine treatments are unproven but are under study.    Drug therapy: Forms of drug therapy include topical, oral (by mouth) and injections (shots). You apply topical drugs directly on the skin over the affected joints. These medicines include capsaicin cream, lidocaine and diclofenac gel. Oral pain relievers such as acetaminophen are common first treatments. So are nonsteroidal anti-inflammatory drugs (often called NSAIDs), which decrease swelling and pain.    In 2010, the government (FDA) approved the use of duloxetine (Cymbalta) for chronic (long-term) musculoskeletal pain including from OA. This oral drug is not new. It also is in use for other health concerns, such as mood disorders, nerve pain and fibromyalgia.    Patients with more serious pain may need stronger medications, such as prescription narcotics.    Joint injections with corticosteroids (sometimes called cortisone shots) or with a form of lubricant called hyaluronic acid can give months of pain relief from OA. This lubricant is given in the knee, and these shots may help delay the need for a knee replacement by a few years in some patients.    Surgery: Surgical treatment becomes an option for severe cases. This includes when the joint has serious damage, or when medical treatment fails to relieve pain and you have major loss of function. Surgery may involve arthroscopy, repair of the joint done through small incisions (cuts). If the joint damage cannot be repaired, you may need a joint replacement.    Supplements: Many over-the-counter nutrition supplements have been used for osteoarthritis treatment. Most lack good research data to support their effectiveness and safety. Among the most widely used are calcium, vitamin D and omega-3 fatty acids. To ensure safety and avoid drug interactions, consult your doctor or pharmacist before using any of these supplements. This is especially true when you are  combining these supplements with prescribed

## 2024-09-17 RX ORDER — SECUKINUMAB 150 MG/ML
300 INJECTION SUBCUTANEOUS
Qty: 2 EACH | Refills: 2 | Status: SHIPPED | OUTPATIENT
Start: 2024-09-17 | End: 2024-10-15

## 2024-09-18 ENCOUNTER — TELEPHONE (OUTPATIENT)
Dept: RHEUMATOLOGY | Facility: CLINIC | Age: 64
End: 2024-09-18

## 2024-09-22 LAB
ABSOLUTE BASOPHILS: 29 CELLS/UL (ref 0–200)
ABSOLUTE EOSINOPHILS: 392 CELLS/UL (ref 15–500)
ABSOLUTE LYMPHOCYTES: 1441 CELLS/UL (ref 850–3900)
ABSOLUTE MONOCYTES: 524 CELLS/UL (ref 200–950)
ABSOLUTE NEUTROPHILS: 2514 CELLS/UL (ref 1500–7800)
ALBUMIN/GLOBULIN RATIO: 1.4 (CALC) (ref 1–2.5)
ALBUMIN: 4.4 G/DL (ref 3.6–5.1)
ALKALINE PHOSPHATASE: 87 U/L (ref 37–153)
ALT: 28 U/L (ref 6–29)
AST: 27 U/L (ref 10–35)
BASOPHILS: 0.6 %
BILIRUBIN, TOTAL: 0.5 MG/DL (ref 0.2–1.2)
BUN: 10 MG/DL (ref 7–25)
CALCIUM: 9.9 MG/DL (ref 8.6–10.4)
CARBON DIOXIDE: 29 MMOL/L (ref 20–32)
CHLORIDE: 100 MMOL/L (ref 98–110)
CREATININE: 0.56 MG/DL (ref 0.5–1.05)
EGFR: 102 ML/MIN/1.73M2
EOSINOPHILS: 8 %
GLOBULIN: 3.2 G/DL (CALC) (ref 1.9–3.7)
GLUCOSE: 96 MG/DL (ref 65–99)
HEMATOCRIT: 41.9 % (ref 35–45)
HEMOGLOBIN: 13.4 G/DL (ref 11.7–15.5)
LYMPHOCYTES: 29.4 %
MCH: 28.8 PG (ref 27–33)
MCHC: 32 G/DL (ref 32–36)
MCV: 90.1 FL (ref 80–100)
MONOCYTES: 10.7 %
MPV: 11.7 FL (ref 7.5–12.5)
NEUTROPHILS: 51.3 %
PLATELET COUNT: 257 THOUSAND/UL (ref 140–400)
POTASSIUM: 4.4 MMOL/L (ref 3.5–5.3)
PROTEIN, TOTAL: 7.6 G/DL (ref 6.1–8.1)
RDW: 12.9 % (ref 11–15)
RED BLOOD CELL COUNT: 4.65 MILLION/UL (ref 3.8–5.1)
SED RATE BY MODIFIED$WESTERGREN: 2 MM/H
SODIUM: 136 MMOL/L (ref 135–146)
WHITE BLOOD CELL COUNT: 4.9 THOUSAND/UL (ref 3.8–10.8)

## 2024-12-16 DIAGNOSIS — L40.50 PSORIATIC ARTHRITIS (HCC): ICD-10-CM

## 2024-12-16 RX ORDER — SECUKINUMAB 150 MG/ML
300 INJECTION SUBCUTANEOUS
Qty: 2 ML | Refills: 2 | Status: SHIPPED | OUTPATIENT
Start: 2024-12-16

## 2024-12-16 NOTE — TELEPHONE ENCOUNTER
LOV: 09/16/2024    Future Appointments   Date Time Provider Department Center   3/17/2025  9:40 AM Zeinab Martin MD [id number] [address]       LF: 07/08/2024     QTY: 2 each     Refills: 2    LABS: Scanned under the lab tab on 10/01/2024 and DRAWN on 09/21/2024

## 2025-03-17 ENCOUNTER — OFFICE VISIT (OUTPATIENT)
Dept: RHEUMATOLOGY | Facility: CLINIC | Age: 65
End: 2025-03-17
Payer: COMMERCIAL

## 2025-03-17 VITALS
WEIGHT: 168 LBS | HEIGHT: 63 IN | TEMPERATURE: 98 F | RESPIRATION RATE: 16 BRPM | DIASTOLIC BLOOD PRESSURE: 82 MMHG | OXYGEN SATURATION: 99 % | BODY MASS INDEX: 29.77 KG/M2 | HEART RATE: 88 BPM | SYSTOLIC BLOOD PRESSURE: 128 MMHG

## 2025-03-17 DIAGNOSIS — M85.89 OSTEOPENIA OF MULTIPLE SITES: ICD-10-CM

## 2025-03-17 DIAGNOSIS — L40.50 PSORIATIC ARTHRITIS (HCC): Primary | ICD-10-CM

## 2025-03-17 DIAGNOSIS — M15.0 PRIMARY OSTEOARTHRITIS INVOLVING MULTIPLE JOINTS: ICD-10-CM

## 2025-03-17 DIAGNOSIS — Z79.899 ENCOUNTER FOR DRUG THERAPY: ICD-10-CM

## 2025-03-17 DIAGNOSIS — D84.9 IMMUNOCOMPROMISED STATE (HCC): ICD-10-CM

## 2025-03-17 PROCEDURE — 3079F DIAST BP 80-89 MM HG: CPT | Performed by: INTERNAL MEDICINE

## 2025-03-17 PROCEDURE — 3074F SYST BP LT 130 MM HG: CPT | Performed by: INTERNAL MEDICINE

## 2025-03-17 PROCEDURE — 3008F BODY MASS INDEX DOCD: CPT | Performed by: INTERNAL MEDICINE

## 2025-03-17 PROCEDURE — 99214 OFFICE O/P EST MOD 30 MIN: CPT | Performed by: INTERNAL MEDICINE

## 2025-03-17 NOTE — PATIENT INSTRUCTIONS
OSTEOARTHRITIS    Fast Facts    Though some of the joint changes are irreversible, most patients will not need joint replacement surgery.    OA symptoms (what you feel) can vary greatly among patients.    A rheumatologist can detect arthritis and prescribe the proper treatment. The goal of treatment in OA is to reduce pain and improve function.    Exercise is an important part of OA treatment, because it can decrease joint pain and improve function.    At present, there is no treatment that can reverse the damage of OA in the joints. Researchers are trying to find ways to slow or reverse this joint damage.    Osteoarthritis (also known as OA) is a common joint disease that most often affects middle-age to elderly people. It is commonly referred to as \"wear and tear\" of the joints, but we now know that OA is a disease of the entire joint, involving the cartilage, joint lining, ligaments, and bone. Although it is more common in older people, it is not really accurate to say that the joints are just \"wearing out.\" It is characterized by breakdown of the cartilage (the tissue that cushions the ends of the bones between joints), bony changes of the joints, deterioration of tendons and ligaments, and various degrees of inflammation of the joint lining (called the synovium).    This arthritis tends to occur in the hand joints, spine, hips, knees, and great toes. The lifetime risk of developing OA of the knee is about 46%, and the lifetime risk of developing OA of the hip is 25%, according to the [facility] Osteoarthritis Project, a long-term study from the [facility] and sponsored by the Centers for Disease Control and Prevention (often called the CDC) and the National Institutes of Health.    OA is a top cause of disability in older people. The goal of osteoarthritis treatment is to reduce pain and improve function. There is no cure for the disease, but some treatments attempt to slow disease  progression.         What is osteoarthritis?    OA is a frequently slowly progressive joint disease typically seen in middle-aged to elderly people. In osteoarthritis, the cartilage between the bones in the joint breaks down. This causes the affected bones to slowly get bigger. The joint cartilage often breaks down because of mechanical stress or biochemical changes within the body, causing the bone underneath to fail. OA can occur together with other types of arthritis, such as gout or rheumatoid arthritis.    OA tends to affect commonly used joints such as the hands and spine, and the weight-bearing joints such as the hips and knees. Symptoms include:    Joint pain and stiffness    Knobby swelling at the joint    Cracking or grinding noise with joint movement    Decreased function of the joint    How do you treat osteoarthritis?    There is no proven treatment yet that can reverse joint damage from OA. The goal of osteoarthritis treatment is to reduce pain and improve function of the affected joints. Most often, this is possible with a mixture of physical measures and drug therapy and, sometimes, surgery.    Physical measures: Weight loss and exercise are useful in OA. Excess weight puts stress on your knee joints and hips and low back. For every 10 pounds of weight you lose over 10 years, you can reduce the chance of developing knee OA by up to 50 percent. Exercise can improve your muscle strength, decrease joint pain and stiffness, and lower the chance of disability due to OA. Also helpful are support (\"assistive\") devices, such as orthotics or a walking cane, that help you do daily activities. Heat or cold therapy can help relieve OA symptoms for a short time.    Certain alternative treatments such as spa (hot tub), massage, and chiropractic manipulation can help relieve pain for a short time. They can be costly, though, and require repeated treatments. Also, the long-term benefits of these alternative  (sometimes called complementary or integrative) medicine treatments are unproven but are under study.    Drug therapy: Forms of drug therapy include topical, oral (by mouth) and injections (shots). You apply topical drugs directly on the skin over the affected joints. These medicines include capsaicin cream, lidocaine and diclofenac gel. Oral pain relievers such as acetaminophen are common first treatments. So are nonsteroidal anti-inflammatory drugs (often called NSAIDs), which decrease swelling and pain.    In 2010, the government (FDA) approved the use of duloxetine (Cymbalta) for chronic (long-term) musculoskeletal pain including from OA. This oral drug is not new. It also is in use for other health concerns, such as mood disorders, nerve pain and fibromyalgia.    Patients with more serious pain may need stronger medications, such as prescription narcotics.    Joint injections with corticosteroids (sometimes called cortisone shots) or with a form of lubricant called hyaluronic acid can give months of pain relief from OA. This lubricant is given in the knee, and these shots may help delay the need for a knee replacement by a few years in some patients.    Surgery: Surgical treatment becomes an option for severe cases. This includes when the joint has serious damage, or when medical treatment fails to relieve pain and you have major loss of function. Surgery may involve arthroscopy, repair of the joint done through small incisions (cuts). If the joint damage cannot be repaired, you may need a joint replacement.    Supplements: Many over-the-counter nutrition supplements have been used for osteoarthritis treatment. Most lack good research data to support their effectiveness and safety. Among the most widely used are calcium, vitamin D and omega-3 fatty acids. To ensure safety and avoid drug interactions, consult your doctor or pharmacist before using any of these supplements. This is especially true when you are  combining these supplements with prescribed

## 2025-03-17 NOTE — PROGRESS NOTES
[facility], [address] [place] [address]      Consult     Chey Peterson Patient Status:  No patient class for patient encounter    1960 MRN [id number]   Location [facility], [address] [place] [address] Attending No att. providers found   Hosp Day # 0 PCP MILANA ARIAS     Referring Provider: PCP    Reason for Consultation: Psoriatic arthritis; osteoarthritis    Subjective:    Chey Peterson is a 64 year old female comes in for reevaluation for history of cutaneous lupus, raynauds, generalized osteoarthritis and now likely psoriatic arthritis and osteopenia with low fracture risk.    She was last seen in clinic 2024    She was improved significantly on Cosentyx 300 mg subcu a month      No further swelling of her joints or psoriasis     More recently noticed but stiffness and no significant swelling    She does admit to using her hands quite a bit with crocheting and painting    Psoriasis is minimal to none (one patch in hair and one small circular spot abdomen    She does not take her mobic every day    Patient has already failed multiple other medications.      States that it does work up to 3 weeks and notices mild stiffness before the injection in 4 weeks.    Considered going back on oral sulfasalazine     We have discussed meloxicam but can also be used for arthritic pain as needed with the winter season coming up    We have reminded patient to update labs today ([facility]    Previously had injection site reactions to Humira; Enbrel and failed [facility] leflunomide and hydroxychloroquine and methotrexate    She does do a lot of crocheting and she does also cook a lot    She is followed by dermatology with topical shampoos and creams for her scalp as well as a small lesion on her right elbow    Unfortunately he also had injection site reaction to Enbrel    Also had reaction to Humira    Denies any infection since last visit    She is now off  hydroxychloroquine    Eye exam was normal November 2022.  She has been off hydroxychloroquine    Previously We had stopped leflunomide    X-rays of the hands and feet were updated showing minimal arthritic changes chest x-ray was normal January 2021    No major stiffness or swelling of her joints    She has not had any infections shortness of breath chest pain fevers or chills.    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    We have discussed possibility of this being psoriasis to begin with especially with her autoimmune testing being negative on several occasions over the years    Has history of abnormal LFTs on methotrexate in the past    She was noted to have mild abnormal LFT. She states she has made some dietary changes and her primary care physician is aware of this. She never had an ultrasound to confirm fatty liver.    Her raynauds has been stable. She denies any oral or nasal ulcers , chest pain shortness of breath fevers or chills. Denies any joint pain or stiffness.    She denies any new rashes. She is trying to take calcium and vitamin D regularly. She does take Tylenol when necessary and Aleve for mild arthritic pain. Denies any night sweats.    Her last DEXA scan was in November 2022.  It showed osteopenia with low fracture risk. This is closely followed by her OB/GYN.  Remind patient she is due now    States no falls or fractures    She is trying to take calcium and vitamin D regularly.      History/Other:        Past Medical History:  Past Medical History:    Essential hypertension    Osteopenia    Systemic lupus erythematosus (HCC)        Past Surgical History:   Past Surgical History:   Procedure Laterality Date    Hysterectomy  2010    [id number]       Social History:  reports  that she has never smoked. She has never used smokeless tobacco. She reports that she does not drink alcohol and does not use drugs.    Family History:   Family History   Problem Relation Age of Onset    Colon Cancer Father     Heart Disease Father     Breast Cancer Sister     Breast Cancer Paternal Aunt     No Known Problems Mother        Allergies:   Allergies   Allergen Reactions    Enbrel [Etanercept] SWELLING    Humira SWELLING    Amoxicillin RASH       Current Medications:  Current Outpatient Medications   Medication Sig Dispense Refill    Secukinumab, 300 MG Dose, (COSENTYX SENSOREADY, 300 MG,) 150 MG/ML Subcutaneous Solution Auto-injector Inject 300 mg into the skin every 28 days. 2 mL 2    Meloxicam 15 MG Oral Tab Take 1 tablet (15 mg total) by mouth daily. 30 tablet 1    Lisinopril-Hydrochlorothiazide 10-12.5 MG Oral Tab TAKE 1 TABLET BY MOUTH ONE TIME A DAY  3    Mometasone Furoate (NASONEX) 50 MCG/ACT Nasal Suspension by Nasal route daily.      Calcium-Phosphorus-Vitamin D (CITRACAL +D3 OR) Take by mouth.      Cetirizine HCl (ZYRTEC ALLERGY OR) Take by mouth.      Dextromethorphan-Guaifenesin (MUCINEX DM OR) Take by mouth. (Patient not taking: Reported on 3/17/2025)        No current facility-administered medications for this visit.       (Not in a hospital admission)      Review of Systems:     Constitutional: Negative for chills, , fatigue, fever and unexpected weight change.    HENT: Negative for congestion, and mouth sores.    Eyes: Negative for photophobia, pain, redness and visual disturbance.    Respiratory: Negative for apnea, cough, chest tightness, shortness of breath, wheezing and stridor.    Cardiovascular: Negative for chest pain, palpitations and leg swelling.    Gastrointestinal: Negative for abdominal distention, abdominal pain, blood in stool, constipation, diarrhea and nausea.    Endocrine: Negative.     Genitourinary: Negative for decreased urine volume, difficulty urinating,  dyspareunia, dysuria, flank pain, and frequency.    Musculoskeletal: Occasional arthralgias, gait problem and joint swelling.    Skin: Negative for color change, pallor and rash. No raynauds or digital ulcerations no sclerodactly.  Mild scalp psoriasis    Allergic/Immunologic: Negative.    Neurological: Negative for dizziness, tremors, seizures, syncope, speech difficulty, weakness, light-headedness, numbness and headaches.    Hematological: Does not bruise/bleed easily.    Psychiatric/Behavioral: Negative for confusion, decreased concentration, hallucinations, self-injury, sleep disturbance and suicidal ideas or depression.    Objective:   [unfilled]  Vitals:    03/17/25 0922   BP: 128/82   Pulse: 88   Resp: 16   Temp: 98.1 °F (36.7 °C)        Wt Readings from Last 6 Encounters:   03/17/25 168 lb (76.2 kg)   09/16/24 161 lb (73 kg)   03/11/24 163 lb (73.9 kg)   09/11/23 169 lb (76.7 kg)   07/19/21 172 lb (78 kg)   07/01/19 170 lb (77.1 kg)     Body mass index is 29.76 kg/m².          Constitutional: is oriented to person, place, and time. Appears well-developed and well-nourished. No distress.    HEENT: Normocephalic; EOMI; no jvd; no LAD; no oral or nasal ulcers.     Eyes: Conjunctivae and EOM are normal. Pupils are equal, round, and reactive to light.     Neck: Normal range of motion. No thyromegaly present.    Cardiovascular: RRR, no murmurs.    Lungs: Clear, Bilateral air entry, no wheezes.    Abdominal: Soft.    Musculoskeletal:         Joint Exam 03/17/2025        Right  Left   Wrist   Tender      CMC   Tender   Tender   MCP 1      Tender   PIP 2 (finger)   Tender   Tender   PIP 3 (finger)   Tender   Tender   PIP 4 (finger)   Tender           Swollen: 0      Tender: 9          Right shoulder: Exhibits normal range of motion on abduction and internal rotation, no tenderness, no bony tenderness, no deformity, no laceration, no pain and no spasm.        Left shoulder: Exhibits normal range of motion on  abduction and internal and external rotation.  no tenderness, no bony tenderness, no swelling, no effusion, no deformity, no pain, no spasm and normal strength.        Right elbow:  Exhibits normal range of motion, no swelling, no effusion and no deformity. No tenderness found. No medial epicondyle, no lateral epicondyle and no olecranon process tenderness noted. There are no contractures or tophi or nodules.        Left elbow:  Normal range of motion, no swelling, no effusion and no deformity. No medial epicondyle, no lateral epicondyle and no olecranon process tenderness noted. There are no contractures or tophi or nodules.        Right wrist:  Exhibits normal range of motion, no tenderness, no bony tenderness, no swelling, no effusion and no crepitus. Flexion and extension intact w/o limitation.        Left wrist: No synovitis no tenderness, no bony tenderness, no swelling, no effusion, no crepitus and no deformity. Flexion and extension without limitation        Right hip: Exhibits normal range of motion, normal strength, no tenderness, no bony tenderness, no swelling and no crepitus.        Right hand: No synovitis of PIP MCP or DIP joints; few small scattered Bouchards and Heberden nodules noted;  strength: 100%.  Mild squaring first CMC joint        Left hand: No synovitis of MCP PIP DIP joints; few small scattered Bouchards and Heberden nodules noted;  strength: 100%.  Mild squaring first CMC joint        Left hip: Exhibits normal range of motion, normal strength, no tenderness, no bony tenderness, No swelling and no crepitus.        Right knee: Exhibits normal range of motion, no swelling, no effusion, no ecchymosis, no deformity and no erythema. No tenderness found. No medial joint line, no lateral joint line, no MCL and no LCL tenderness noted. mild crepitation on flexion of knee and extension normal.        Left knee:  Exhibits normal range of motion, no swelling, no effusion, no ecchymosis and  no erythema. No tenderness found. No medial joint line, no lateral joint line and no patellar tendon tenderness noted. mild crepitation on flexion of the knee. Extension intact and normal.        Right ankle: No swelling, no deformity. No tenderness. Dorsiflexion and plantar flexion intact without limitation in range of motion.        Left ankle: Exhibits no swelling. No tenderness. No lateral malleolus and no medial malleolus tenderness found. Achilles tendon normal. Achilles tendon exhibits no pain, no defect and normal English's test results.  Dorsiflexion and plantar flexion intact without limitation in range of motion.        Cervical back: Exhibits normal range of motion, no tenderness, no bony tenderness, no swelling, no pain and no spasm.        Thoracic back: Exhibits normal range of motion, no tenderness, no bony tenderness and no spasm.        Lumbar back:  Exhibits normal range of motion, no tenderness, no bony tenderness, no pain and no spasm.        Right foot: normal. There is normal range of motion, no tenderness, no bony tenderness, no crepitus and no laceration. There is no synovitis or tenderness of the MTP joints to palpation.  Bony enlargement first MTP joint        Left foot: normal. There is normal range of motion, no tenderness, no bony tenderness and no crepitus. There is no synovitis or tenderness of the MTP joints to palpation.  Mild bony enlargement first MTP joint    Lymphadenopathy: No submental, no submandibular, and no occipital adenopathy present, has no cervical adenopathy or axillary lympadenopathy.    Neurological: Alert and oriented. No focal motor or sensory abnormalities. Strength is 5/5 Upper Extremities/Lower Extremities proximally and distally.    Skin: Skin is warm, dry and intact.  Mild psoriasis of the scalp    Psychiatric: Normal behavior.    Results:    Labs:      Lab Results   Component Value Date    WBC 4.9 09/21/2024    RBC 4.65 09/21/2024    HGB 13.4 09/21/2024     HCT 41.9 09/21/2024    MCV 90.1 09/21/2024    MCH 28.8 09/21/2024    MCHC 32.0 09/21/2024    RDW 12.9 09/21/2024     09/21/2024       No components found for: \"RELY\", \"NMET\", \"MYEL\", \"PROMY\", \"ARUN\", \"ABSNEUTS\", \"ABSBANDS\", \"ABMM\", \"ABMY\", \"ABPM\", \"ABBL\"      Lab Results   Component Value Date     09/21/2024    K 4.4 09/21/2024    CO2 29 09/21/2024    BUN 10 09/21/2024    ALB 4.4 09/21/2024    AST 27 09/21/2024    ALT 28 09/21/2024          No components found for: \"ESRWESTERGRN\"       No results found for: \"CRP\"      No results found for: \"COLOR\", \"CLARITY\", \"UROBILINOGEN\", \"YEAST\"    Noted CBC CMP normal TB testing normal May 2023          [unfilled]    Imaging:  No results found.    Assessment & Plan:      64-year-old woman comes in for reevaluation for:    Psoriatic arthritis (extensive plaque psoriasis with erosive changes on MRI of the hand)  Raynauds  Generalized osteoarthritis  Osteopenia with low fracture risk  High Drug risk monitoring  History of abnormal LFTs  Suspect tendinitis and de Quervain's tenosynovitis of the right hand likely from repetitive use    No further synovitis or psoriasis   I suspect more tendinitis related to overuse and arthritis  We will update x-rays of the hands and feet to look for radiographic progression  We will check labs uric acid levels inflammatory markers try Medrol Dosepak  Restart meloxicam and take more regularly will update labs to make sure she has no drug toxicity  Initially had some sweats but she states that has subsided and she is not interested in switching medication  Stiffness after the third week she is open to going back on meloxicam is not interested in going back on oral DMARD sulfasalazine or methotrexate  History of abnormal LFTs in the past    If she worsens she will need to call us to consider switching treatment.    She declines for now    Given her information on Skyrizi and Taltz in the meantime for further reading    Reluctant to  switch therapy unless patient is wanting to.  My suspicion is on lower end for active disease and more tendon and arthritis related pain and stiffness    Steroid pack discussed    Could also consider going back on sulfasalazine if her labs are stable    Previously failed methotrexate leflunomide hydroxychloroquine Humira Enbrel and Kerry    Suspect her CMC joint and hand pain is likely more arthritic and tendon related    Restart meloxicam 15 mg daily.  Risk of NSAIDs discussed reminded patient update CBC CMP today and ESR    Risk of NSAIDs discussed    Previously seen by dermatology    Update x-rays and DEXA scan next visit    Remain off leflunomide. Remain off hydroxychloroquine weaned last visit    Could consider another class of medication if she has problems with this as well    Her autoimmune testing has been normal on several occasions otherwise  I question whether she actually had discoid lupus in the past?    I discussed monitoring for changes of lupus over time    Failed methotrexate And leflunomide with abnormal LFTs in the past    Consider sulfasalazine in the future    Patient is asymptomatic from her raynauds    Continue calcium and vitamin D states had her bone density would like to obtain this report    Avoid too much Tylenol and Aleve    Education and counseling provided to patient.  Instructed patient to call my office or seek medical attention immediately if symptoms worsen. Risks and side effects of medications and diagnosis discussed in detail and patient was given written information on new prescribed medications.    Return to clinic:  Return in about 4 months (around 7/17/2025).    Zeinab Martin MD  9/11/2023

## 2025-03-18 DIAGNOSIS — L40.50 PSORIATIC ARTHRITIS (HCC): ICD-10-CM

## 2025-03-18 RX ORDER — SECUKINUMAB 150 MG/ML
300 INJECTION SUBCUTANEOUS
Qty: 2 ML | Refills: 0 | Status: SHIPPED | OUTPATIENT
Start: 2025-03-18

## 2025-03-18 NOTE — TELEPHONE ENCOUNTER
LOV: 03/17/2025    Future Appointments   Date Time Provider Department Center   7/15/2025 12:40 PM Zeinab Martin MD [id number] [address]       LF: 03/05/2025    QTY: 2    Refills: 0    LABS: Scanned under the lab tab on 10/01/2024 and DRAWN on 09/21/2024. Labs were ordered for her yesterday at her visit, just not completed yet

## 2025-03-24 DIAGNOSIS — L40.50 PSORIATIC ARTHRITIS (HCC): Primary | ICD-10-CM

## 2025-03-24 DIAGNOSIS — M19.90 OSTEOARTHRITIS, UNSPECIFIED OSTEOARTHRITIS TYPE, UNSPECIFIED SITE: ICD-10-CM

## 2025-03-24 LAB
ABSOLUTE BASOPHILS: 41 CELLS/UL (ref 0–200)
ABSOLUTE EOSINOPHILS: 97 CELLS/UL (ref 15–500)
ABSOLUTE LYMPHOCYTES: 1219 CELLS/UL (ref 850–3900)
ABSOLUTE MONOCYTES: 408 CELLS/UL (ref 200–950)
ABSOLUTE NEUTROPHILS: 3335 CELLS/UL (ref 1500–7800)
ALBUMIN/GLOBULIN RATIO: 1.4 (CALC) (ref 1–2.5)
ALBUMIN: 4.6 G/DL (ref 3.6–5.1)
ALKALINE PHOSPHATASE: 85 U/L (ref 37–153)
ALT: 22 U/L (ref 6–29)
AST: 20 U/L (ref 10–35)
BASOPHILS: 0.8 %
BILIRUBIN, TOTAL: 0.7 MG/DL (ref 0.2–1.2)
BUN: 11 MG/DL (ref 7–25)
CALCIUM: 10.2 MG/DL (ref 8.6–10.4)
CARBON DIOXIDE: 29 MMOL/L (ref 20–32)
CHLORIDE: 101 MMOL/L (ref 98–110)
CREATININE: 0.62 MG/DL (ref 0.5–1.05)
EGFR: 100 ML/MIN/1.73M2
EOSINOPHILS: 1.9 %
GLOBULIN: 3.3 G/DL (CALC) (ref 1.9–3.7)
GLUCOSE: 94 MG/DL (ref 65–99)
HEMATOCRIT: 41.6 % (ref 35–45)
HEMOGLOBIN: 13.7 G/DL (ref 11.7–15.5)
HEPATITIS B SURFACE$ANTIBODY (QUANT): <5 MIU/ML
LYMPHOCYTES: 23.9 %
MCH: 29 PG (ref 27–33)
MCHC: 32.9 G/DL (ref 32–36)
MCV: 88.1 FL (ref 80–100)
MITOGEN-NIL: 2.39 IU/ML
MONOCYTES: 8 %
MPV: 11.1 FL (ref 7.5–12.5)
NEUTROPHILS: 65.4 %
NIL: 0.01 IU/ML
PLATELET COUNT: 262 THOUSAND/UL (ref 140–400)
POTASSIUM: 4.2 MMOL/L (ref 3.5–5.3)
PROTEIN, TOTAL: 7.9 G/DL (ref 6.1–8.1)
QUANTIFERON(R)-TB GOLD PLUS, 1 TUBE: NEGATIVE
RDW: 13 % (ref 11–15)
RED BLOOD CELL COUNT: 4.72 MILLION/UL (ref 3.8–5.1)
SED RATE BY MODIFIED$WESTERGREN: 2 MM/H
SODIUM: 137 MMOL/L (ref 135–146)
T4, FREE: 1.4 NG/DL (ref 0.8–1.8)
TB1-NIL: 0 IU/ML
TB2-NIL: 0 IU/ML
TSH W/REFLEX TO FT4: 0.01 MIU/L (ref 0.4–4.5)
URIC ACID: 4.9 MG/DL (ref 2.5–7)
VITAMIN D, 25-OH, TOTAL: 26 NG/ML (ref 30–100)
WHITE BLOOD CELL COUNT: 5.1 THOUSAND/UL (ref 3.8–10.8)

## 2025-03-24 NOTE — TELEPHONE ENCOUNTER
Spoke to patient regarding the below message:    0 Result Notes        Component  Ref Range & Units 3 d ago   TSH W/REFLEX TO FT4  0.40 - 4.50 mIU/L 0.01 Low    T4, FREE  0.8 - 1.8 ng/dL 1.4            TSH is abnormal does she have a primary care physician that she follows up with if not we can refer her to endocrinology to further address her abnormal TSH     To do further testing and consider treatment     She has a primary care physician would strongly advise her to follow-up and we can fax these labs to them     This is ?     Vitamin D is low at 26 would do high-dose vitamin D 50,000 units once a week for 3 months then over-the-counter 3 to 4000 IU D3     Will let her know if anything else comes back concerning       Patient verbalized understanding. She states that she does see Dr Giang as her PCP. Labs have been faxed over to his office for her to f/u with.     She states that on one of her HEP B tests it states that she has no immunity to Hepatitis B? She is asking what that means? She also asked if she is to be starting a new medication and if she should stop her Cosentyx? I advised patient not to stop any medications until  responds to her questions. Patient verbalized understanding and denied any further questions or concerns at this time.

## 2025-03-24 NOTE — TELEPHONE ENCOUNTER
0 Result Notes      Component  Ref Range & Units 3 d ago   TSH W/REFLEX TO FT4  0.40 - 4.50 mIU/L 0.01 Low    T4, FREE  0.8 - 1.8 ng/dL 1.4          TSH is abnormal does she have a primary care physician that she follows up with if not we can refer her to endocrinology to further address her abnormal TSH    To do further testing and consider treatment    She has a primary care physician would strongly advise her to follow-up and we can fax these labs to them    This is ?    Vitamin D is low at 26 would do high-dose vitamin D 50,000 units once a week for 3 months then over-the-counter 3 to 4000 IU D3    Will let her know if anything else comes back concerning

## 2025-03-25 ENCOUNTER — TELEPHONE (OUTPATIENT)
Dept: RHEUMATOLOGY | Facility: CLINIC | Age: 65
End: 2025-03-25

## 2025-03-25 RX ORDER — HYDROXYCHLOROQUINE SULFATE 200 MG/1
200 TABLET, FILM COATED ORAL DAILY
Qty: 30 TABLET | Refills: 3 | Status: SHIPPED | OUTPATIENT
Start: 2025-03-25

## 2025-03-25 RX ORDER — ERGOCALCIFEROL 1.25 MG/1
50000 CAPSULE, LIQUID FILLED ORAL WEEKLY
Qty: 4 CAPSULE | Refills: 2 | Status: SHIPPED | OUTPATIENT
Start: 2025-03-25 | End: 2025-04-24

## 2025-03-25 RX ORDER — HYDROXYCHLOROQUINE SULFATE 200 MG/1
200 TABLET, FILM COATED ORAL DAILY
Qty: 30 TABLET | Refills: 0 | Status: CANCELLED | OUTPATIENT
Start: 2025-03-25

## 2025-03-25 NOTE — TELEPHONE ENCOUNTER
Spoke to patient about starting Sulfasalazine and she stated that she was on that once before and it shot up her kidney numbers. She states that she did take Hydroxychloroquine before and it worked well for so if she could go back on that she would like to.     Spoke to Dr Martin and she stated that she was ok with patient going back on Hydroxychloroquine and starting it at once daily. Patient is agreeable to this plan.     Informed her that we would be calling in the high dose Vitamin D prescription for to take.

## 2025-04-14 DIAGNOSIS — L40.50 PSORIATIC ARTHRITIS (HCC): ICD-10-CM

## 2025-04-14 RX ORDER — SECUKINUMAB 150 MG/ML
300 INJECTION SUBCUTANEOUS
Qty: 2 ML | Refills: 0 | Status: SHIPPED | OUTPATIENT
Start: 2025-04-14

## 2025-04-14 NOTE — TELEPHONE ENCOUNTER
LOV: 03/17/2025    Future Appointments   Date Time Provider Department Center   7/15/2025 12:40 PM Zeinab Martin MD [facility] [address]       LF: 03/26/2025    QTY: 2     Refills: 0    LABS:   Component      Latest Ref Rng 3/21/2025   WBC      3.8 - 10.8 Thousand/uL 5.1    RBC      3.80 - 5.10 Million/uL 4.72    Hemoglobin      11.7 - 15.5 g/dL 13.7    Hematocrit      35.0 - 45.0 % 41.6    MCV      80.0 - 100.0 fL 88.1    MCH      27.0 - 33.0 pg 29.0    MCHC      32.0 - 36.0 g/dL 32.9    RDW      11.0 - 15.0 % 13.0    Platelet Count      140 - 400 Thousand/uL 262    MPV      7.5 - 12.5 fL 11.1    Neutrophils Absolute      1,500 - 7,800 cells/uL 3,335    Lymphocytes Absolute      850 - 3,900 cells/uL 1,219    Monocytes Absolute      200 - 950 cells/uL 408    Eosinophils Absolute      15 - 500 cells/uL 97    Basophils Absolute      0 - 200 cells/uL 41    Neutrophils %      % 65.4    Lymphocytes %      % 23.9    Monocytes %      % 8.0    Eosinophils %      % 1.9    Basophils %      % 0.8    Glucose      65 - 99 mg/dL 94    BUN      7 - 25 mg/dL 11    CREATININE      0.50 - 1.05 mg/dL 0.62    EGFR      > OR = 60 mL/min/1.73m2 100    BUN/CREATININE RATIO      6 - 22 (calc) SEE NOTE:    Sodium      135 - 146 mmol/L 137    Potassium      3.5 - 5.3 mmol/L 4.2    Chloride      98 - 110 mmol/L 101    Carbon Dioxide, Total      20 - 32 mmol/L 29    CALCIUM      8.6 - 10.4 mg/dL 10.2    PROTEIN, TOTAL      6.1 - 8.1 g/dL 7.9    Albumin      3.6 - 5.1 g/dL 4.6    Globulin      1.9 - 3.7 g/dL (calc) 3.3    A/G Ratio      1.0 - 2.5 (calc) 1.4    Total Bilirubin      0.2 - 1.2 mg/dL 0.7    Alkaline Phosphatase      37 - 153 U/L 85    AST (SGOT)      10 - 35 U/L 20    ALT (SGPT)      6 - 29 U/L 22    SED RATE BY MODIFIED$HEVERREN      < OR = 30 mm/h 2

## 2025-05-12 DIAGNOSIS — L40.50 PSORIATIC ARTHRITIS (HCC): ICD-10-CM

## 2025-05-12 RX ORDER — SECUKINUMAB 150 MG/ML
300 INJECTION SUBCUTANEOUS
Qty: 2 ML | Refills: 2 | Status: SHIPPED | OUTPATIENT
Start: 2025-05-12

## 2025-05-12 NOTE — TELEPHONE ENCOUNTER
LOV: 03/17/2025    Future Appointments   Date Time Provider Department Center   7/15/2025 12:40 PM Zeinab Martin MD [facility] [address]       LF: 04/14/2025    QTY: 2 mL    Refills: 0    LABS:   Component      Latest Ref Rng 3/21/2025   WBC      3.8 - 10.8 Thousand/uL 5.1    RBC      3.80 - 5.10 Million/uL 4.72    Hemoglobin      11.7 - 15.5 g/dL 13.7    Hematocrit      35.0 - 45.0 % 41.6    MCV      80.0 - 100.0 fL 88.1    MCH      27.0 - 33.0 pg 29.0    MCHC      32.0 - 36.0 g/dL 32.9    RDW      11.0 - 15.0 % 13.0    Platelet Count      140 - 400 Thousand/uL 262    MPV      7.5 - 12.5 fL 11.1    Neutrophils Absolute      1,500 - 7,800 cells/uL 3,335    Lymphocytes Absolute      850 - 3,900 cells/uL 1,219    Monocytes Absolute      200 - 950 cells/uL 408    Eosinophils Absolute      15 - 500 cells/uL 97    Basophils Absolute      0 - 200 cells/uL 41    Neutrophils %      % 65.4    Lymphocytes %      % 23.9    Monocytes %      % 8.0    Eosinophils %      % 1.9    Basophils %      % 0.8    Glucose      65 - 99 mg/dL 94    BUN      7 - 25 mg/dL 11    CREATININE      0.50 - 1.05 mg/dL 0.62    EGFR      > OR = 60 mL/min/1.73m2 100    BUN/CREATININE RATIO      6 - 22 (calc) SEE NOTE:    Sodium      135 - 146 mmol/L 137    Potassium      3.5 - 5.3 mmol/L 4.2    Chloride      98 - 110 mmol/L 101    Carbon Dioxide, Total      20 - 32 mmol/L 29    CALCIUM      8.6 - 10.4 mg/dL 10.2    PROTEIN, TOTAL      6.1 - 8.1 g/dL 7.9    Albumin      3.6 - 5.1 g/dL 4.6    Globulin      1.9 - 3.7 g/dL (calc) 3.3    A/G Ratio      1.0 - 2.5 (calc) 1.4    Total Bilirubin      0.2 - 1.2 mg/dL 0.7    Alkaline Phosphatase      37 - 153 U/L 85    AST (SGOT)      10 - 35 U/L 20    ALT (SGPT)      6 - 29 U/L 22    SED RATE BY MODIFIED$WESTERGREN      < OR = 30 mm/h 2

## 2025-05-29 DIAGNOSIS — M25.532 PAIN AND SWELLING OF LEFT WRIST: ICD-10-CM

## 2025-05-29 DIAGNOSIS — M19.90 OSTEOARTHRITIS, UNSPECIFIED OSTEOARTHRITIS TYPE, UNSPECIFIED SITE: ICD-10-CM

## 2025-05-29 DIAGNOSIS — M15.0 PRIMARY OSTEOARTHRITIS INVOLVING MULTIPLE JOINTS: ICD-10-CM

## 2025-05-29 DIAGNOSIS — L40.50 PSORIATIC ARTHRITIS (HCC): Primary | ICD-10-CM

## 2025-05-29 DIAGNOSIS — M25.432 PAIN AND SWELLING OF LEFT WRIST: ICD-10-CM

## 2025-05-29 RX ORDER — MELOXICAM 15 MG/1
15 TABLET ORAL DAILY
Qty: 30 TABLET | Refills: 0 | Status: SHIPPED | OUTPATIENT
Start: 2025-05-29

## 2025-05-29 NOTE — TELEPHONE ENCOUNTER
LOV: 03/17/2025    Future Appointments   Date Time Provider Department Center   7/15/2025 12:40 PM Zeinab Martin MD [facility] [address]       LF: 02/24/2025    QTY: 30    Refills: 0    LABS:   Component      Latest Ref Rng 3/21/2025   WBC      3.8 - 10.8 Thousand/uL 5.1    RBC      3.80 - 5.10 Million/uL 4.72    Hemoglobin      11.7 - 15.5 g/dL 13.7    Hematocrit      35.0 - 45.0 % 41.6    MCV      80.0 - 100.0 fL 88.1    MCH      27.0 - 33.0 pg 29.0    MCHC      32.0 - 36.0 g/dL 32.9    RDW      11.0 - 15.0 % 13.0    Platelet Count      140 - 400 Thousand/uL 262    MPV      7.5 - 12.5 fL 11.1    Neutrophils Absolute      1,500 - 7,800 cells/uL 3,335    Lymphocytes Absolute      850 - 3,900 cells/uL 1,219    Monocytes Absolute      200 - 950 cells/uL 408    Eosinophils Absolute      15 - 500 cells/uL 97    Basophils Absolute      0 - 200 cells/uL 41    Neutrophils %      % 65.4    Lymphocytes %      % 23.9    Monocytes %      % 8.0    Eosinophils %      % 1.9    Basophils %      % 0.8    Glucose      65 - 99 mg/dL 94    BUN      7 - 25 mg/dL 11    CREATININE      0.50 - 1.05 mg/dL 0.62    EGFR      > OR = 60 mL/min/1.73m2 100    BUN/CREATININE RATIO      6 - 22 (calc) SEE NOTE:    Sodium      135 - 146 mmol/L 137    Potassium      3.5 - 5.3 mmol/L 4.2    Chloride      98 - 110 mmol/L 101    Carbon Dioxide, Total      20 - 32 mmol/L 29    CALCIUM      8.6 - 10.4 mg/dL 10.2    PROTEIN, TOTAL      6.1 - 8.1 g/dL 7.9    Albumin      3.6 - 5.1 g/dL 4.6    Globulin      1.9 - 3.7 g/dL (calc) 3.3    A/G Ratio      1.0 - 2.5 (calc) 1.4    Total Bilirubin      0.2 - 1.2 mg/dL 0.7    Alkaline Phosphatase      37 - 153 U/L 85    AST (SGOT)      10 - 35 U/L 20    ALT (SGPT)      6 - 29 U/L 22    SED RATE BY MODIFIED$HEVERREN      < OR = 30 mm/h 2

## 2025-07-15 ENCOUNTER — OFFICE VISIT (OUTPATIENT)
Dept: RHEUMATOLOGY | Facility: CLINIC | Age: 65
End: 2025-07-15
Payer: COMMERCIAL

## 2025-07-15 VITALS
SYSTOLIC BLOOD PRESSURE: 130 MMHG | DIASTOLIC BLOOD PRESSURE: 72 MMHG | HEART RATE: 96 BPM | OXYGEN SATURATION: 96 % | HEIGHT: 63 IN | RESPIRATION RATE: 16 BRPM | WEIGHT: 170 LBS | BODY MASS INDEX: 30.12 KG/M2 | TEMPERATURE: 98 F

## 2025-07-15 DIAGNOSIS — M25.532 PAIN AND SWELLING OF LEFT WRIST: ICD-10-CM

## 2025-07-15 DIAGNOSIS — M19.90 OSTEOARTHRITIS, UNSPECIFIED OSTEOARTHRITIS TYPE, UNSPECIFIED SITE: ICD-10-CM

## 2025-07-15 DIAGNOSIS — D84.9 IMMUNOCOMPROMISED STATE (HCC): ICD-10-CM

## 2025-07-15 DIAGNOSIS — M15.0 PRIMARY OSTEOARTHRITIS INVOLVING MULTIPLE JOINTS: ICD-10-CM

## 2025-07-15 DIAGNOSIS — Z79.899 ENCOUNTER FOR DRUG THERAPY: ICD-10-CM

## 2025-07-15 DIAGNOSIS — M85.89 OSTEOPENIA OF MULTIPLE SITES: ICD-10-CM

## 2025-07-15 DIAGNOSIS — L40.50 PSORIATIC ARTHRITIS (HCC): Primary | ICD-10-CM

## 2025-07-15 DIAGNOSIS — M25.432 PAIN AND SWELLING OF LEFT WRIST: ICD-10-CM

## 2025-07-15 PROCEDURE — 3078F DIAST BP <80 MM HG: CPT | Performed by: INTERNAL MEDICINE

## 2025-07-15 PROCEDURE — 99214 OFFICE O/P EST MOD 30 MIN: CPT | Performed by: INTERNAL MEDICINE

## 2025-07-15 PROCEDURE — 3075F SYST BP GE 130 - 139MM HG: CPT | Performed by: INTERNAL MEDICINE

## 2025-07-15 PROCEDURE — 3008F BODY MASS INDEX DOCD: CPT | Performed by: INTERNAL MEDICINE

## 2025-07-15 RX ORDER — GLUCOSAMINE HCL 500 MG
3000 TABLET ORAL DAILY
COMMUNITY

## 2025-07-15 RX ORDER — HYDROXYCHLOROQUINE SULFATE 200 MG/1
200 TABLET, FILM COATED ORAL DAILY
Qty: 30 TABLET | Refills: 1 | Status: SHIPPED | OUTPATIENT
Start: 2025-07-15

## 2025-07-15 RX ORDER — MELOXICAM 15 MG/1
15 TABLET ORAL DAILY
Qty: 30 TABLET | Refills: 0 | Status: SHIPPED | OUTPATIENT
Start: 2025-07-15

## 2025-07-15 NOTE — PROGRESS NOTES
[facility], [address] [place] [address]      Consult     Chey Peterson Patient Status:  No patient class for patient encounter    1960 MRN [id number]   Location [facility], [address] [place] [address] Attending No att. providers found   Hosp Day # 0 PCP MILANA ARIAS     Referring Provider: PCP    Reason for Consultation: Psoriatic arthritis; osteoarthritis    Subjective:    Chey Peterson is a 65 year old female comes in for reevaluation for history of cutaneous lupus, raynauds, generalized osteoarthritis and now likely psoriatic arthritis and osteopenia with low fracture risk.    She was last seen in clinic 2025    She was improved significantly on Cosentyx 300 mg subcu a month    Mild increase stiffness in the hands she was leery about sulfasalazine so she did hydroxychloroquine 200 mg daily with improvement    States she has an eye exam set up shortly to monitor for baseline drug toxicity    No further swelling of her joints or psoriasis     More recently noticed but stiffness and no significant swelling    She does admit to using her hands quite a bit with crocheting and painting    Psoriasis is minimal to none (one patch in hair and one small circular spot abdomen    She does not take her mobic every day    Patient has already failed multiple other medications.      We have discussed meloxicam but can also be used for arthritic pain as needed with the winter season coming up    We have reminded patient to update labs today ([facility]    Previously had injection site reactions to Humira; Enbrel and failed [facility] leflunomide and hydroxychloroquine and methotrexate    She does do a lot of crocheting and she does also cook a lot    She is followed by dermatology with topical shampoos and creams for her scalp as well as a small lesion on her right elbow    Unfortunately he also had injection site reaction to Enbrel    Also had reaction to  Humira    Denies any infection since last visit    She is now off hydroxychloroquine    Eye exam was normal November 2022.  She has been off hydroxychloroquine    Previously We had stopped leflunomide    X-rays of the hands and feet were updated showing minimal arthritic changes chest x-ray was normal January 2021    No major stiffness or swelling of her joints    She has not had any infections shortness of breath chest pain fevers or chills.    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    We have discussed possibility of this being psoriasis to begin with especially with her autoimmune testing being negative on several occasions over the years    Has history of abnormal LFTs on methotrexate in the past    She was noted to have mild abnormal LFT. She states she has made some dietary changes and her primary care physician is aware of this. She never had an ultrasound to confirm fatty liver.    Her raynauds has been stable. She denies any oral or nasal ulcers , chest pain shortness of breath fevers or chills. Denies any joint pain or stiffness.    She denies any new rashes. She is trying to take calcium and vitamin D regularly. She does take Tylenol when necessary and Aleve for mild arthritic pain. Denies any night sweats.    Her last DEXA scan was in November 2022.  It showed osteopenia with low fracture risk. This is closely followed by her OB/GYN.  Remind patient she is due now    States no falls or fractures    She is trying to take calcium and vitamin D regularly.      History/Other:        Past Medical History:  Past Medical History:    Essential hypertension    Osteopenia    Systemic lupus erythematosus (HCC)        Past Surgical History:   Past Surgical History:   Procedure Laterality Date     Hysterectomy  2010    [id number]       Social History:  reports that she has never smoked. She has never used smokeless tobacco. She reports that she does not drink alcohol and does not use drugs.    Family History:   Family History   Problem Relation Age of Onset    Colon Cancer Father     Heart Disease Father     Breast Cancer Sister     Breast Cancer Paternal Aunt     No Known Problems Mother        Allergies:   Allergies   Allergen Reactions    Enbrel [Etanercept] SWELLING    Humira SWELLING    Amoxicillin RASH       Current Medications:  Current Outpatient Medications   Medication Sig Dispense Refill    Cholecalciferol (VITAMIN D3) 75 MCG (3000 UT) Oral Tab Take 3,000 mg by mouth in the morning.      MELOXICAM 15 MG Oral Tab take 1 tablet (15 MG total) by mouth daily. 30 tablet 0    Secukinumab, 300 MG Dose, (COSENTYX SENSOREADY, 300 MG,) 150 MG/ML Subcutaneous Solution Auto-injector Inject 300 mg into the skin every 28 days. 2 mL 2    hydroxychloroquine (PLAQUENIL) 200 MG Oral Tab Take 1 tablet (200 mg total) by mouth daily. With food 30 tablet 3    Lisinopril-Hydrochlorothiazide 10-12.5 MG Oral Tab TAKE 1 TABLET BY MOUTH ONE TIME A DAY  3    Calcium-Phosphorus-Vitamin D (CITRACAL +D3 OR) Take by mouth.      Cetirizine HCl (ZYRTEC ALLERGY OR) Take by mouth.      Mometasone Furoate (NASONEX) 50 MCG/ACT Nasal Suspension by Nasal route daily. (Patient not taking: Reported on 7/15/2025)      Dextromethorphan-Guaifenesin (MUCINEX DM OR) Take by mouth. (Patient not taking: Reported on 7/15/2025)        No current facility-administered medications for this visit.       (Not in a hospital admission)      Review of Systems:     Constitutional: Negative for chills, , fatigue, fever and unexpected weight change.    HENT: Negative for congestion, and mouth sores.    Eyes: Negative for photophobia, pain, redness and visual disturbance.    Respiratory: Negative for apnea, cough, chest tightness, shortness of breath,  wheezing and stridor.    Cardiovascular: Negative for chest pain, palpitations and leg swelling.    Gastrointestinal: Negative for abdominal distention, abdominal pain, blood in stool, constipation, diarrhea and nausea.    Endocrine: Negative.     Genitourinary: Negative for decreased urine volume, difficulty urinating, dyspareunia, dysuria, flank pain, and frequency.    Musculoskeletal: Occasional arthralgias, gait problem and joint swelling.    Skin: Negative for color change, pallor and rash. No raynauds or digital ulcerations no sclerodactly.  Mild scalp psoriasis    Allergic/Immunologic: Negative.    Neurological: Negative for dizziness, tremors, seizures, syncope, speech difficulty, weakness, light-headedness, numbness and headaches.    Hematological: Does not bruise/bleed easily.    Psychiatric/Behavioral: Negative for confusion, decreased concentration, hallucinations, self-injury, sleep disturbance and suicidal ideas or depression.    Objective:   [unfilled]  Vitals:    07/15/25 1226   BP: 130/72   Pulse: 96   Resp: 16   Temp: 98.3 °F (36.8 °C)        Wt Readings from Last 6 Encounters:   07/15/25 170 lb (77.1 kg)   03/17/25 168 lb (76.2 kg)   09/16/24 161 lb (73 kg)   03/11/24 163 lb (73.9 kg)   09/11/23 169 lb (76.7 kg)   07/19/21 172 lb (78 kg)     Body mass index is 30.11 kg/m².          Constitutional: is oriented to person, place, and time. Appears well-developed and well-nourished. No distress.    HEENT: Normocephalic; EOMI; no jvd; no LAD; no oral or nasal ulcers.     Eyes: Conjunctivae and EOM are normal. Pupils are equal, round, and reactive to light.     Neck: Normal range of motion. No thyromegaly present.    Cardiovascular: RRR, no murmurs.    Lungs: Clear, Bilateral air entry, no wheezes.    Abdominal: Soft.    Musculoskeletal:    There is currently no information documented on the homunculus. Go to the Rheumatology activity and complete the homunculus joint exam.     Joint Exam  07/15/2025     No joint exam has been documented for this visit        Swollen: --     Tender: --         Right shoulder: Exhibits normal range of motion on abduction and internal rotation, no tenderness, no bony tenderness, no deformity, no laceration, no pain and no spasm.        Left shoulder: Exhibits normal range of motion on abduction and internal and external rotation.  no tenderness, no bony tenderness, no swelling, no effusion, no deformity, no pain, no spasm and normal strength.        Right elbow:  Exhibits normal range of motion, no swelling, no effusion and no deformity. No tenderness found. No medial epicondyle, no lateral epicondyle and no olecranon process tenderness noted. There are no contractures or tophi or nodules.        Left elbow:  Normal range of motion, no swelling, no effusion and no deformity. No medial epicondyle, no lateral epicondyle and no olecranon process tenderness noted. There are no contractures or tophi or nodules.        Right wrist:  Exhibits normal range of motion, no tenderness, no bony tenderness, no swelling, no effusion and no crepitus. Flexion and extension intact w/o limitation.        Left wrist: No synovitis no tenderness, no bony tenderness, no swelling, no effusion, no crepitus and no deformity. Flexion and extension without limitation        Right hip: Exhibits normal range of motion, normal strength, no tenderness, no bony tenderness, no swelling and no crepitus.        Right hand: No synovitis of MCP PIP MCP or DIP joints; few small scattered Bouchards and Heberden nodules noted;  strength: 100%.  Mild squaring first CMC joint        Left hand: No synovitis of MCP PIP DIP joints; few small scattered Bouchards and Heberden nodules noted;  strength: 100%.  Mild squaring first CMC joint        Left hip: Exhibits normal range of motion, normal strength, no tenderness, no bony tenderness, No swelling and no crepitus.        Right knee: Exhibits normal range of  motion, no swelling, no effusion, no ecchymosis, no deformity and no erythema. No tenderness found. No medial joint line, no lateral joint line, no MCL and no LCL tenderness noted. mild crepitation on flexion of knee and extension normal.        Left knee:  Exhibits normal range of motion, no swelling, no effusion, no ecchymosis and no erythema. No tenderness found. No medial joint line, no lateral joint line and no patellar tendon tenderness noted. mild crepitation on flexion of the knee. Extension intact and normal.        Right ankle: No swelling, no deformity. No tenderness. Dorsiflexion and plantar flexion intact without limitation in range of motion.        Left ankle: Exhibits no swelling. No tenderness. No lateral malleolus and no medial malleolus tenderness found. Achilles tendon normal. Achilles tendon exhibits no pain, no defect and normal English's test results.  Dorsiflexion and plantar flexion intact without limitation in range of motion.        Cervical back: Exhibits normal range of motion, no tenderness, no bony tenderness, no swelling, no pain and no spasm.        Thoracic back: Exhibits normal range of motion, no tenderness, no bony tenderness and no spasm.        Lumbar back:  Exhibits normal range of motion, no tenderness, no bony tenderness, no pain and no spasm.        Right foot: normal. There is normal range of motion, no tenderness, no bony tenderness, no crepitus and no laceration. There is no synovitis or tenderness of the MTP joints to palpation.  Bony enlargement first MTP joint        Left foot: normal. There is normal range of motion, no tenderness, no bony tenderness and no crepitus. There is no synovitis or tenderness of the MTP joints to palpation.  Mild bony enlargement first MTP joint    Lymphadenopathy: No submental, no submandibular, and no occipital adenopathy present, has no cervical adenopathy or axillary lympadenopathy.    Neurological: Alert and oriented. No focal motor  or sensory abnormalities. Strength is 5/5 Upper Extremities/Lower Extremities proximally and distally.    Skin: Skin is warm, dry and intact.  Mild psoriasis of the scalp    Psychiatric: Normal behavior.    Results:    Labs:      Lab Results   Component Value Date    WBC 5.1 03/21/2025    RBC 4.72 03/21/2025    HGB 13.7 03/21/2025    HCT 41.6 03/21/2025    MCV 88.1 03/21/2025    MCH 29.0 03/21/2025    MCHC 32.9 03/21/2025    RDW 13.0 03/21/2025     03/21/2025       No components found for: \"RELY\", \"NMET\", \"MYEL\", \"PROMY\", \"ARUN\", \"ABSNEUTS\", \"ABSBANDS\", \"ABMM\", \"ABMY\", \"ABPM\", \"ABBL\"      Lab Results   Component Value Date     03/21/2025    K 4.2 03/21/2025    CO2 29 03/21/2025    BUN 11 03/21/2025    ALB 4.6 03/21/2025    AST 20 03/21/2025    ALT 22 03/21/2025          No components found for: \"ESRWESTERGRN\"       No results found for: \"CRP\"      No results found for: \"COLOR\", \"CLARITY\", \"UROBILINOGEN\", \"YEAST\"    Noted CBC CMP normal TB testing normal May 2023          [unfilled]    Imaging:  No results found.    Assessment & Plan:      64-year-old woman comes in for reevaluation for:    Psoriatic arthritis (extensive plaque psoriasis with erosive changes on MRI of the hand)  Raynauds  Generalized osteoarthritis  Osteopenia with low fracture risk  High Drug risk monitoring  History of abnormal LFTs    No further synovitis or psoriasis   I suspect more tendinitis related to overuse and arthritis  X-rays of the hands showing stable arthritis as well as foot x-rays unrevealing   Continue meloxicam and take more regularly will update labs to make sure she has no drug toxicity    Stiffness after the third week she is open to going back on meloxicam is not interested in going back on oral DMARD sulfasalazine or methotrexate    History of abnormal LFTs in the past    If she worsens she will need to call us to consider switching treatment.    She declines for now    Given her information on Skyrizi and Taltz  in the meantime for further reading    Reluctant to switch therapy unless patient is wanting to.  My suspicion is on lower end for active disease and more tendon and arthritis related pain and stiffness    Steroid pack discussed    Could also consider going back on sulfasalazine if her labs are stable    Previously failed methotrexate leflunomide hydroxychloroquine Humira Enbrel and Kerry    Suspect her CMC joint and hand pain is likely more arthritic and tendon related    Continue meloxicam 15 mg daily.  Risk of NSAIDs discussed reminded patient update CBC CMP today and ESR    Risk of NSAIDs discussed    Previously seen by dermatology    Update DEXA scan next visit    Remain off leflunomide.     Could consider another class of medication if she has problems with this as well    Her autoimmune testing has been normal on several occasions otherwise  I question whether she actually had discoid lupus in the past?    I discussed monitoring for changes of lupus over time    Failed methotrexate And leflunomide with abnormal LFTs in the past    She declined sulfasalazine    Patient is asymptomatic from her raynauds    Continue calcium and vitamin D states had her bone density would like to obtain this report    Avoid too much Tylenol and Aleve    Education and counseling provided to patient.  Instructed patient to call my office or seek medical attention immediately if symptoms worsen. Risks and side effects of medications and diagnosis discussed in detail and patient was given written information on new prescribed medications.    Return to clinic:  Return in about 6 months (around 1/15/2026).    Zeinab Martin MD  9/11/2023

## 2025-07-20 LAB
ABSOLUTE BASOPHILS: 48 CELLS/UL (ref 0–200)
ABSOLUTE EOSINOPHILS: 172 CELLS/UL (ref 15–500)
ABSOLUTE LYMPHOCYTES: 1179 CELLS/UL (ref 850–3900)
ABSOLUTE MONOCYTES: 378 CELLS/UL (ref 200–950)
ABSOLUTE NEUTROPHILS: 2622 CELLS/UL (ref 1500–7800)
ALBUMIN/GLOBULIN RATIO: 1.4 (CALC) (ref 1–2.5)
ALBUMIN: 4.6 G/DL (ref 3.6–5.1)
ALKALINE PHOSPHATASE: 63 U/L (ref 37–153)
ALT: 21 U/L (ref 6–29)
AST: 21 U/L (ref 10–35)
BASOPHILS: 1.1 %
BILIRUBIN, TOTAL: 0.7 MG/DL (ref 0.2–1.2)
BUN: 12 MG/DL (ref 7–25)
CALCIUM: 9.8 MG/DL (ref 8.6–10.4)
CARBON DIOXIDE: 26 MMOL/L (ref 20–32)
CHLORIDE: 103 MMOL/L (ref 98–110)
CREATININE: 0.7 MG/DL (ref 0.5–1.05)
EGFR: 97 ML/MIN/1.73M2
EOSINOPHILS: 3.9 %
GLOBULIN: 3.3 G/DL (CALC) (ref 1.9–3.7)
GLUCOSE: 89 MG/DL (ref 65–99)
HEMATOCRIT: 42.4 % (ref 35–45)
HEMOGLOBIN: 13.9 G/DL (ref 11.7–15.5)
LYMPHOCYTES: 26.8 %
MCH: 29.1 PG (ref 27–33)
MCHC: 32.8 G/DL (ref 32–36)
MCV: 88.9 FL (ref 80–100)
MONOCYTES: 8.6 %
MPV: 11.3 FL (ref 7.5–12.5)
NEUTROPHILS: 59.6 %
PLATELET COUNT: 256 THOUSAND/UL (ref 140–400)
POTASSIUM: 4.2 MMOL/L (ref 3.5–5.3)
PROTEIN, TOTAL: 7.9 G/DL (ref 6.1–8.1)
RDW: 13 % (ref 11–15)
RED BLOOD CELL COUNT: 4.77 MILLION/UL (ref 3.8–5.1)
SED RATE BY MODIFIED$WESTERGREN: 6 MM/H
SODIUM: 137 MMOL/L (ref 135–146)
URIC ACID: 5.1 MG/DL (ref 2.5–7)
WHITE BLOOD CELL COUNT: 4.4 THOUSAND/UL (ref 3.8–10.8)

## 2025-08-05 DIAGNOSIS — L40.50 PSORIATIC ARTHRITIS (HCC): ICD-10-CM

## 2025-08-05 RX ORDER — SECUKINUMAB 150 MG/ML
300 INJECTION SUBCUTANEOUS
Qty: 2 ML | Refills: 2 | Status: SHIPPED | OUTPATIENT
Start: 2025-08-05

## 2025-08-26 DIAGNOSIS — M25.432 PAIN AND SWELLING OF LEFT WRIST: ICD-10-CM

## 2025-08-26 DIAGNOSIS — M15.0 PRIMARY OSTEOARTHRITIS INVOLVING MULTIPLE JOINTS: ICD-10-CM

## 2025-08-26 DIAGNOSIS — L40.50 PSORIATIC ARTHRITIS (HCC): ICD-10-CM

## 2025-08-26 DIAGNOSIS — M25.532 PAIN AND SWELLING OF LEFT WRIST: ICD-10-CM

## 2025-08-26 DIAGNOSIS — M19.90 OSTEOARTHRITIS, UNSPECIFIED OSTEOARTHRITIS TYPE, UNSPECIFIED SITE: ICD-10-CM

## 2025-08-26 RX ORDER — MELOXICAM 15 MG/1
15 TABLET ORAL DAILY
Qty: 90 TABLET | Refills: 0 | Status: SHIPPED | OUTPATIENT
Start: 2025-08-26

## (undated) NOTE — LETTER
March 11, 2024         Ibrahima Giang DO  [address] [facility] [address]      Patient: Chey Peterson   YOB: 1960   Date of Visit: 3/11/2024       Dear Dr. Giang,    I saw your patient, Chey Peterson, on 3/11/2024. Enclosed is my consultation / progress note from that encounter. Thank you for allowing me to participate in the care of this patient.    Sincerely,                           Zeinab Martin MD  [address], [address] [place] [id number]    Document electronically generated by:  Zeinab Martin MD on 3/11/2024    CC: No Recipients    Enclosure